# Patient Record
Sex: MALE | Race: WHITE | Employment: OTHER | URBAN - METROPOLITAN AREA
[De-identification: names, ages, dates, MRNs, and addresses within clinical notes are randomized per-mention and may not be internally consistent; named-entity substitution may affect disease eponyms.]

---

## 2019-12-21 ENCOUNTER — APPOINTMENT (OUTPATIENT)
Dept: GENERAL RADIOLOGY | Age: 49
End: 2019-12-21
Payer: MEDICAID

## 2019-12-21 ENCOUNTER — HOSPITAL ENCOUNTER (EMERGENCY)
Age: 49
Discharge: HOME | End: 2019-12-21
Attending: EMERGENCY MEDICINE
Payer: MEDICAID

## 2019-12-21 VITALS
SYSTOLIC BLOOD PRESSURE: 142 MMHG | TEMPERATURE: 99.7 F | RESPIRATION RATE: 18 BRPM | HEART RATE: 102 BPM | OXYGEN SATURATION: 99 % | BODY MASS INDEX: 22.1 KG/M2 | DIASTOLIC BLOOD PRESSURE: 90 MMHG | WEIGHT: 141.09 LBS

## 2019-12-21 DIAGNOSIS — M79.642 HAND PAIN, LEFT: ICD-10-CM

## 2019-12-21 DIAGNOSIS — M79.641 HAND PAIN, RIGHT: Primary | ICD-10-CM

## 2019-12-21 PROCEDURE — 6370000000 HC RX 637 (ALT 250 FOR IP): Performed by: EMERGENCY MEDICINE

## 2019-12-21 PROCEDURE — 99283 EMERGENCY DEPT VISIT LOW MDM: CPT

## 2019-12-21 RX ORDER — NAPROXEN 500 MG/1
500 TABLET ORAL 2 TIMES DAILY
Qty: 60 TABLET | Refills: 0 | Status: SHIPPED | OUTPATIENT
Start: 2019-12-21 | End: 2020-01-07

## 2019-12-21 RX ORDER — TRAMADOL HYDROCHLORIDE 50 MG/1
50 TABLET ORAL ONCE
Status: COMPLETED | OUTPATIENT
Start: 2019-12-21 | End: 2019-12-21

## 2019-12-21 RX ADMIN — TRAMADOL HYDROCHLORIDE 50 MG: 50 TABLET, FILM COATED ORAL at 06:22

## 2019-12-21 ASSESSMENT — PAIN DESCRIPTION - ORIENTATION: ORIENTATION: LEFT

## 2019-12-21 ASSESSMENT — PAIN DESCRIPTION - PROGRESSION: CLINICAL_PROGRESSION: GRADUALLY WORSENING

## 2019-12-21 ASSESSMENT — PAIN DESCRIPTION - LOCATION: LOCATION: HAND

## 2019-12-21 ASSESSMENT — PAIN SCALES - GENERAL
PAINLEVEL_OUTOF10: 8
PAINLEVEL_OUTOF10: 8
PAINLEVEL_OUTOF10: 7

## 2019-12-21 ASSESSMENT — PAIN DESCRIPTION - FREQUENCY: FREQUENCY: CONTINUOUS

## 2019-12-21 ASSESSMENT — PAIN DESCRIPTION - ONSET: ONSET: ON-GOING

## 2019-12-21 ASSESSMENT — PAIN - FUNCTIONAL ASSESSMENT: PAIN_FUNCTIONAL_ASSESSMENT: PREVENTS OR INTERFERES SOME ACTIVE ACTIVITIES AND ADLS

## 2019-12-21 ASSESSMENT — PAIN DESCRIPTION - PAIN TYPE: TYPE: ACUTE PAIN

## 2019-12-21 ASSESSMENT — PAIN DESCRIPTION - DESCRIPTORS: DESCRIPTORS: DISCOMFORT

## 2019-12-22 ASSESSMENT — ENCOUNTER SYMPTOMS
BACK PAIN: 0
COLOR CHANGE: 0

## 2019-12-30 ENCOUNTER — HOSPITAL ENCOUNTER (EMERGENCY)
Age: 49
Discharge: HOME | End: 2019-12-30
Payer: MEDICAID

## 2019-12-30 VITALS
HEIGHT: 70 IN | TEMPERATURE: 98 F | OXYGEN SATURATION: 100 % | HEART RATE: 90 BPM | WEIGHT: 138.67 LBS | RESPIRATION RATE: 16 BRPM | BODY MASS INDEX: 19.85 KG/M2 | DIASTOLIC BLOOD PRESSURE: 88 MMHG | SYSTOLIC BLOOD PRESSURE: 145 MMHG

## 2019-12-30 PROCEDURE — 99283 EMERGENCY DEPT VISIT LOW MDM: CPT

## 2019-12-30 ASSESSMENT — ENCOUNTER SYMPTOMS
SHORTNESS OF BREATH: 0
CHEST TIGHTNESS: 0
VOMITING: 0
NAUSEA: 0

## 2019-12-30 ASSESSMENT — PAIN DESCRIPTION - ORIENTATION: ORIENTATION: RIGHT;LEFT

## 2019-12-30 ASSESSMENT — PAIN DESCRIPTION - LOCATION: LOCATION: ARM

## 2019-12-30 ASSESSMENT — PAIN DESCRIPTION - PAIN TYPE: TYPE: ACUTE PAIN

## 2019-12-30 ASSESSMENT — PAIN SCALES - GENERAL: PAINLEVEL_OUTOF10: 7

## 2019-12-30 ASSESSMENT — PAIN DESCRIPTION - DESCRIPTORS: DESCRIPTORS: ACHING

## 2019-12-30 NOTE — ED NOTES
Acknowledged pt by pt's name. Verified pt by name and date of birth. Checked arm band, allergies, reviewed past medical history. Introduced myself to patient  Duration of ED plan of care explained to patient  Explained planned tests and procedures  Thanked patient for coming to [facility].    Asked if there was anything else I could do for the patient before exiting room. CB in reach.      Analisa Huizar, RN  12/30/19 2421

## 2019-12-30 NOTE — CARE COORDINATION
ED Advocate met with patient and discussed the importance of a PCP. ED Advocate discussed [facility] PCP's and Patient is agreeable to see Sandra Perales in the [facility].   ED Advocate call [facility] Referral line and set up appointment for patient on [email or web address].    Patient given information on [facility]

## 2019-12-30 NOTE — ED PROVIDER NOTES
[address] [address]  Dept: [address]: [phone]  eMERGENCYdEPARTMENT eNCOUnter      Pt Name: Oskar Rogers  MRN: [id number]  Sanjeev 1970  Date of evaluation: 12/30/2019  Provider:Florina Lao PA-C    CHIEF COMPLAINT       Chief Complaint   Patient presents with    Arm Pain     bilateral x 2 weeks. 4th visit to ERs for the same. 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 HISTORY OF PRESENT ILLNESS  (Location/Symptom, Timing/Onset, Context/Setting, Quality, Duration,Modifying Factors, Severity.)   Oksar Rogers is a 44 y.o. male who presents to the emergency department complaining of bilateral arm pain. Patient states symptoms began after loading to [place] with boxes through work at [employer]. Patient woke up with pain and swelling in his wrist the next morning. He was seen at outside ED for symptoms. He was started on naproxen however did not fill medication. Was then prescribed prednisone which he has been taking as prescribed with no improvement. Patient followed up as an outpatient with physical therapy. Patient due for physical therapy again on 1/2/2019. Patient states pain has been progressively worsening in severity. Patient states he always has numbness and tingling in his left arm. States numbness and tingling have been worse since injury. Pain rated 7/10. All pain worsens with movement. Nursing Notes were reviewedand agreed with or any disagreements were addressed in the HPI. REVIEW OF SYSTEMS    (2-9 systems for level 4, 10 or more for level 5)     Review of Systems   Constitutional: Negative for chills and fever. HENT: Negative. Respiratory: Negative for chest tightness and shortness of breath. Cardiovascular: Negative. Gastrointestinal: Negative for nausea and vomiting. Genitourinary: Negative.     Musculoskeletal: Positive for arthralgias and joint swelling. Skin: Negative. Neurological: Positive for numbness. Psychiatric/Behavioral: Negative for behavioral problems and confusion. Except as noted above the remainder of the review of systems was reviewed and negative. PAST MEDICAL HISTORY   History reviewed. No pertinent past medical history. SURGICAL HISTORY     History reviewed. No pertinent surgical history. CURRENT MEDICATIONS     [unfilled]    ALLERGIES     Pcn [penicillins]    FAMILY HISTORY     History reviewed. No pertinent family history. No family status information on file. SOCIAL HISTORY      reports that he has been smoking cigarettes. He has never used smokeless tobacco. He reports current alcohol use. PHYSICAL EXAM    (up to 7 for level 4, 8 or more for level 5)     ED Triage Vitals [12/30/19 1605]   Enc Vitals Group      BP (!) 145/88      Pulse 90      Resp 16      Temp 98 °F (36.7 °C)      Temp Source Oral      SpO2 100 %      Weight 138 lb 10.7 oz (62.9 kg)      Height 5' 10\" (1.778 m)      Head Circumference       Peak Flow       Pain Score       Pain Loc       Pain Edu? Excl. in [address]? Physical Exam  Vitals signs reviewed. Constitutional:       Appearance: Normal appearance. HENT:      Head: Normocephalic and atraumatic. Nose: Nose normal.   Eyes:      Extraocular Movements: Extraocular movements intact. Musculoskeletal:      Comments: LUE: Tenderness to palpation throughout left wrist with associated edema, no erythema or warmth. Reproducible pain with range of motion of the left wrist, no other focal bony tenderness throughout extremity. Radial pulse +2, motor median/ulnar/radial nerve intact   Skin:     General: Skin is warm. Neurological:      General: No focal deficit present. Mental Status: He is alert and oriented to person, place, and time.    Psychiatric:         Mood and Affect: Mood normal.         Behavior: Behavior normal.           DIAGNOSTIC RESULTS     EKG: All EKG's are interpreted by the Emergency Department Physician who either signs or Co-signs this chart in the absence of a cardiologist.    RADIOLOGY:   Non-plain film images such as CT, Ultrasound and MRI are read by the radiologist. Plain radiographic images are visualized and preliminarilyinterpreted by the emergency physician with the below findings:    Interpretation per the Radiologist below,if available at the time of this note:    No orders to display         LABS:  Labs Reviewed - No data to display    All other labs were within normal range or not returned as of this dictation. EMERGENCY DEPARTMENT COURSE and DIFFERENTIAL DIAGNOSIS/MDM:   Vitals:    Vitals:    12/30/19 1605   BP: (!) 145/88   Pulse: 90   Resp: 16   Temp: 98 °F (36.7 °C)   TempSrc: Oral   SpO2: 100%   Weight: 138 lb 10.7 oz (62.9 kg)   Height: 5' 10\" (1.778 m)       MDM     Patient presents ED with HPI noted above. He is hemodynamically stable, afebrile and nontoxic-appearing. He is not hypoxic oxygen saturation of 100% on room air. Physical exam as above. Reproducible left wrist pain on exam.  Patient has chronic numbness and tingling to left upper extremity, has been worse since injury. Denies any new trauma or injury since. All pain reproducible on exam.  Patient with no other associated physical complaints. Patient is an alcoholic. He is currently taking steroids that were prescribed at outside hospital for this injury for tendinitis. He is already been seen and evaluated regarding physical therapy. He already has a splint. He missed his appointment with his orthopedic specialist.  There are no new complaints today no associated symptoms. He was concerned for nerve injury. He is already on prednisone. There is no additional test warranted or indicated in the ED at this time. Patient is to follow-up with orthopedic specialist as previously arranged, provided contact information at time of discharge.   Told return the ED should he have acute worsening or new symptoms. Discharged home in stable condition. The patient tolerated their visit well. I have discussed the findings of today's workup with the patient and addressed the patient's questions and concerns. Important warning signs as well as new or worsening symptoms which would necessitate immediate return to the ED were discussed. The plan is to discharge from the ED at this time, and the patient is in stable condition. The patient acknowledged understanding is agreeable with this plan. CONSULTS:  None    PROCEDURES:  Procedures    FINAL IMPRESSION      1.  Left wrist pain          DISPOSITION/PLAN   [unfilled]    PATIENT REFERRED TO:  [address]  [phone]  Go to   If symptoms worsen    [facility]  Phone: [phone]:  Discharge Medication List as of 12/30/2019  5:52 PM          (Please note that portions of this note were completed with a voice recognition program.  Efforts were made to edit the dictations but occasionally words are mis-transcribed.)    [address] [facility]          [address], [place]  12/30/19 8047

## 2020-01-07 ENCOUNTER — HOSPITAL ENCOUNTER (EMERGENCY)
Age: 50
Discharge: HOME | End: 2020-01-08
Attending: EMERGENCY MEDICINE
Payer: MEDICAID

## 2020-01-07 ENCOUNTER — HOSPITAL ENCOUNTER (EMERGENCY)
Age: 50
Discharge: HOME | End: 2020-01-07
Attending: EMERGENCY MEDICINE
Payer: MEDICAID

## 2020-01-07 ENCOUNTER — HOSPITAL ENCOUNTER (EMERGENCY)
Age: 50
Discharge: HOME | End: 2020-01-07
Payer: MEDICAID

## 2020-01-07 ENCOUNTER — APPOINTMENT (OUTPATIENT)
Dept: GENERAL RADIOLOGY | Age: 50
End: 2020-01-07
Payer: MEDICAID

## 2020-01-07 VITALS
HEART RATE: 86 BPM | RESPIRATION RATE: 18 BRPM | SYSTOLIC BLOOD PRESSURE: 135 MMHG | TEMPERATURE: 98.1 F | OXYGEN SATURATION: 94 % | WEIGHT: 154.98 LBS | BODY MASS INDEX: 22.24 KG/M2 | DIASTOLIC BLOOD PRESSURE: 82 MMHG

## 2020-01-07 VITALS
WEIGHT: 144.4 LBS | TEMPERATURE: 98.6 F | DIASTOLIC BLOOD PRESSURE: 100 MMHG | HEIGHT: 67 IN | SYSTOLIC BLOOD PRESSURE: 125 MMHG | OXYGEN SATURATION: 97 % | HEART RATE: 90 BPM | BODY MASS INDEX: 22.66 KG/M2 | RESPIRATION RATE: 18 BRPM

## 2020-01-07 VITALS
SYSTOLIC BLOOD PRESSURE: 129 MMHG | DIASTOLIC BLOOD PRESSURE: 85 MMHG | WEIGHT: 140.65 LBS | TEMPERATURE: 97.8 F | RESPIRATION RATE: 15 BRPM | HEART RATE: 89 BPM | OXYGEN SATURATION: 96 % | BODY MASS INDEX: 20.18 KG/M2

## 2020-01-07 PROCEDURE — 99283 EMERGENCY DEPT VISIT LOW MDM: CPT

## 2020-01-07 PROCEDURE — 73110 X-RAY EXAM OF WRIST: CPT

## 2020-01-07 PROCEDURE — 99282 EMERGENCY DEPT VISIT SF MDM: CPT

## 2020-01-07 ASSESSMENT — PAIN SCALES - GENERAL
PAINLEVEL_OUTOF10: 8
PAINLEVEL_OUTOF10: 7
PAINLEVEL_OUTOF10: 8

## 2020-01-07 ASSESSMENT — PAIN DESCRIPTION - FREQUENCY
FREQUENCY: CONTINUOUS
FREQUENCY: CONTINUOUS

## 2020-01-07 ASSESSMENT — PAIN DESCRIPTION - LOCATION
LOCATION: HAND
LOCATION: ARM

## 2020-01-07 ASSESSMENT — PAIN DESCRIPTION - PAIN TYPE
TYPE: ACUTE PAIN
TYPE: ACUTE PAIN

## 2020-01-07 ASSESSMENT — PAIN DESCRIPTION - DESCRIPTORS
DESCRIPTORS: BURNING
DESCRIPTORS: ACHING

## 2020-01-07 ASSESSMENT — PAIN DESCRIPTION - PROGRESSION: CLINICAL_PROGRESSION: NOT CHANGED

## 2020-01-07 ASSESSMENT — PAIN DESCRIPTION - ORIENTATION
ORIENTATION: LEFT
ORIENTATION: LEFT

## 2020-01-07 ASSESSMENT — PAIN DESCRIPTION - ONSET
ONSET: AWAKENED FROM SLEEP
ONSET: ON-GOING

## 2020-01-07 ASSESSMENT — PAIN - FUNCTIONAL ASSESSMENT: PAIN_FUNCTIONAL_ASSESSMENT: ACTIVITIES ARE NOT PREVENTED

## 2020-01-07 NOTE — ED NOTES
Discharge instructions reviewed with pt and pt denied having any questions. Discharge paperwork signed and pt discharged.         Danya Cooks, RN  01/07/20 9504

## 2020-01-07 NOTE — ED PROVIDER NOTES
Triage Chief Complaint:   Arm Pain (Brought by [facility]. Pt was waiting outside his home, has leaves allover his shirt and hat, disheveled appearance. c/o left arm pain from an old injury in December at [employer]. smell of alcohol noted. Alert and oriented. )      [place]:  Doreen Griffin is a 44 y.o. male that presents with pain in his left arm. Patient states that he injured his arm loading [employer] truck with boxes on 19 December. He has been seen several times in various emergency departments for the same problem. He states that initially his pain was in his hand and wrist on both sides. It got worse on the left side and then began moving up his arm. He has been in wrist splints but is no longer wearing it. He has been given Naprosyn and steroids. Tendinopathy and neuropraxia has been suggested as an etiology. Patient states that he was referred to a specialist but has failed outpatient treatment. Initially he told me that he was not currently covered by insurance but this is a Workmen's Comp. injury. Patient was significantly intoxicated at time of exam and admits to being an alcoholic. He was covered in leaves at the time of my exam and states that he fell in the driveway of his home. He identifies no acute injury from the fall tonight.     ROS:  · Constitutional: No Fever or Weight Loss  · Eyes: No Decreased Vision  · ENT: No stridor, sore throat, congestion,    · Cardiovascular: No chest pain or palpitations   · Respiratory: No shortness of breath, cough, or wheezing  · Gastrointestinal: No abdominal pain, nausea, vomiting, or diarrhea  · Genitourinary: No dysuria, or flank pain  · Musculoskeletal:  No  weakness, positive left wrist and arm pain  · Integumentary: No rash, bruising, or pruritis  · Psychiatric: No anxiety or depression, positive alcoholism    Past Medical History:   Diagnosis Date    ETOH abuse      Past Surgical History:   Procedure Laterality Date    WRIST SURGERY Right History reviewed. No pertinent family history. Social History     Socioeconomic History    Marital status: Single     Spouse name: Not on file    Number of children: Not on file    Years of education: Not on file    Highest education level: Not on file   Occupational History    Not on file   Social Needs    Financial resource strain: Not on file    Food insecurity:     Worry: Not on file     Inability: Not on file    Transportation needs:     Medical: Not on file     Non-medical: Not on file   Tobacco Use    Smoking status: Current Every Day Smoker     Types: Cigarettes    Smokeless tobacco: Never Used   Substance and Sexual Activity    Alcohol use: Yes     Comment: daily    Drug use: Not on file    Sexual activity: Not on file   Lifestyle    Physical activity:     Days per week: Not on file     Minutes per session: Not on file    Stress: Not on file   Relationships    Social connections:     Talks on phone: Not on file     Gets together: Not on file     Attends Anabaptism service: Not on file     Active member of club or organization: Not on file     Attends meetings of clubs or organizations: Not on file     Relationship status: Not on file    Intimate partner violence:     Fear of current or ex partner: Not on file     Emotionally abused: Not on file     Physically abused: Not on file     Forced sexual activity: Not on file   Other Topics Concern    Not on file   Social History Narrative    Not on file     No current facility-administered medications for this encounter.       Current Outpatient Medications   Medication Sig Dispense Refill    Capsaicin (ZOSTRIX) 0.035 % CREA cream Apply topically 3 times daily 42.5 g 0    naproxen (NAPROSYN) 500 MG tablet Take 1 tablet by mouth 2 times daily 60 tablet 0     Allergies   Allergen Reactions    Codeine Itching    Pcn [Penicillins]      Nursing Notes Reviewed    Physical Exam:  ED Triage Vitals [01/07/20 0250]   Enc Vitals Group      BP (!) 153/99      Pulse 91      Resp 17      Temp 98.1 °F (36.7 °C)      Temp Source Oral      SpO2 99 %      Weight 154 lb 15.7 oz (70.3 kg)      Height       Head Circumference       Peak Flow       Pain Score       Pain Loc       Pain Edu? Excl. in [address]? GENERAL APPEARANCE: A disheveled intoxicated jovial 49-year-old male in no acute distress  HEAD: Normocephalic, atraumatic  EYES: Sclera anicteric.bilateral conjunctival injection,  ENT: Mucous membranes moist, no nasal discharge, pharynx clear, no stridor,   NECK: Supple, no meningismus, no pain with movement or palpation, no upper extremity radiculopathy with 5/5 strength normal sensation and full radial ulnar and median nerve function in the hands with bilaterally normal radial and ulnar pulses  HEART: RRR without rubs murmurs or gallops  LUNGS:  Clear good air movement no wheezing no retraction or accessory muscle use,  EXTREMITIES: No acute deformities, patient has as mentioned full radial ulnar and median nerve function in both hands. He had carpal tunnel surgery done on the right wrist with a well-healed incision, and a large laceration on the ulnar aspect of the left wrist which she was unable to remember. SKIN: Warm and dry. Normal color, no rash,  capillary refill less than 2 seconds  MENTAL STATUS: Alert, oriented, intoxicated but in control of his faculties, generally interactive,   NEUROLOGICAL:  No facial drooping. moves all 4 extremities, sensation intact, no focal findings     Nursing note and vital signs reviewed     I have reviewed and interpreted all of the currently available lab results from this visit (if applicable):  No results found for this visit on 01/07/20. Radiographs (if obtained):  [] Radiologist's Report Reviewed:  XR WRIST LEFT (MIN 3 VIEWS)   Final Result   No acute fracture or malalignment.              [] Discussed with Radiologist:     [] The following radiograph was interpreted by myself in the absence of a radiologist:     EKG (if obtained): (All EKG's are interpreted by myself in the absence of a cardiologist)      MDM:   Alcoholic man with ongoing problems from an injury had occurred at work. He has failed outpatient follow-up with orthopedics and I have referred him once again. After some discussion he understands that this is a Workmen's Comp. injury and will be covered by the Workmen's Comp. insurance with no out-of-pocket expenses for him. At this point in time I find no evidence of any acute injury or radiculopathy. His arm and hand is vascularly intact. I also discussed with him the need to follow-up with Alcoholics Anonymous and work a program of recovery    Final Impression:  1. Left arm pain    2. Acute alcoholic intoxication without complication [facility])        Critical Care:       Disposition referral (if applicable):  Juvenal Hoskins MD  [address] [place] [address]. [address] [place], [place] [phone]    Call in 1 day        Disposition medications (if applicable):  New Prescriptions    No medications on file       Comment: Please note this report has been produced using speech recognition software and may contain errors related to that system including errors in grammar, punctuation, and spelling, as well as words and phrases that may be inappropriate. If there are any questions or concerns please feel free to contact the dictating provider for clarification.       (Please note that portions of this note may have been completed with a voice recognition program. Efforts were made to edit the dictations but occasionally words are mis-transcribed.)    MD Lisy Barroso., MD  01/07/20 5572

## 2020-01-08 PROCEDURE — 6360000002 HC RX W HCPCS: Performed by: EMERGENCY MEDICINE

## 2020-01-08 PROCEDURE — 96372 THER/PROPH/DIAG INJ SC/IM: CPT

## 2020-01-08 RX ORDER — KETOROLAC TROMETHAMINE 30 MG/ML
30 INJECTION, SOLUTION INTRAMUSCULAR; INTRAVENOUS ONCE
Status: COMPLETED | OUTPATIENT
Start: 2020-01-08 | End: 2020-01-08

## 2020-01-08 RX ADMIN — KETOROLAC TROMETHAMINE 30 MG: 30 INJECTION, SOLUTION INTRAMUSCULAR at 01:28

## 2020-01-08 ASSESSMENT — PAIN SCALES - GENERAL: PAINLEVEL_OUTOF10: 7

## 2020-01-08 NOTE — ED PROVIDER NOTES
deformity. No tenderness in the extremities. All extremities neurovascularly intact. Radial, Dp, and PT pulses +2/4 bilaterally   Neurological: Alert and oriented. No facial droop. Strength 5/5 in UE and LE bilaterally. Sensation intact x 4. No aphasia or dysarthria. No limb or gait ataxia. Psychiatric: appears intoxicated but able to have conversation and exhibits capacity     LABS  I have reviewed all labs for this visit. No results found for this visit on 01/07/20. ED COURSE/MDM  Patient seen and evaluated. Old records reviewed. Labs and imaging reviewed and results discussed with patient. Patient is a 49-year-old male presenting with left arm pain. This is his third emergency department as it in the past 24 hours. He denies any change in the pain, he states he is frustrated and wants the problem to be fixed. He has an appointment with orthopedics on Thursday. There is no soft tissue swelling or sign of septic joint on my exam.  He is afebrile and has normal vital signs here. The arm is neurovascularly intact and he exhibits full motion intact. He does have history of alcohol abuse and does smell of alcohol here, although he does appear to be able to have full conversation and exhibits capacity. I do not feel indication for imaging tonight. I did explain to him that I can treat his pain with Toradol here but he ultimately needs to follow-up with orthopedics. He does voice understanding of this. He is discharged in stable condition. During the patient's ED course, the patient was given:  Medications   ketorolac (TORADOL) injection 30 mg (30 mg Intramuscular Given 1/8/20 0128)        CLINICAL IMPRESSION  1. Left arm pain        Blood pressure (!) 125/100, pulse 90, temperature 98.6 °F (37 °C), temperature source Oral, resp. rate 18, height 5' 7\" (1.702 m), weight 144 lb 6.4 oz (65.5 kg), SpO2 97 %. Patient was given scripts for the following medications.  I counseled patient how to take

## 2020-01-08 NOTE — ED PROVIDER NOTES
[address] [address] [address] [address]  Dept: [phone]  Loc: [address] ENCOUNTER        This patient was not seen or evaluated by the attending physician. I evaluated this patient, the attending physician was available for consultation. CHIEF COMPLAINT    Chief Complaint   Patient presents with    Hand Pain     pt presents to ED for c/o L hand pain since 12/18/19. seen at [facility] ER today and has an appointment at [facility] specialsit on thursday. pt appears to be intoxicated. HPI    Matilde Curran is a 44 y.o. male who presents with left wrist pain. The onset was on 12/18. The duration has been constant since the onset. The quality of the pain is sharp and the severity is 8/10. The patient has no other associated injury. 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 He was seen at [address] [place] [address] emergency department earlier today, had an x-ray that showed no fracture. He presents to our ED complaining that they were unable to stop his pain at his prior ED visit.     REVIEW OF SYSTEMS    Skin: No lacerations or puncture wounds  Musculoskeletal: see HPI, no other joint or bony injury or pain  Neurologic: no paresthesias or focal distal extremity weakness    PAST MEDICAL & SURGICAL HISTORY    Past Medical History:   Diagnosis Date    ETOH abuse      Past Surgical History:   Procedure Laterality Date    WRIST SURGERY Right        CURRENT MEDICATIONS  (may include discharge medications prescribed in the ED)  Current Outpatient Rx   Medication Sig Dispense Refill    Capsaicin (ZOSTRIX) 0.035 % CREA cream Apply topically 3 times daily 42.5 g 0    naproxen (NAPROSYN) 500 MG tablet Take 1 tablet by mouth 2 times daily 60 tablet 0       ALLERGIES    Allergies   Allergen Reactions    Codeine Itching    Pcn [Penicillins]        SOCIAL & FAMILY HISTORY    Social History     Socioeconomic History    Marital status: Single     Spouse name: None    Number of children: None    Years of education: None    Highest education level: None   Occupational History    None   Social Needs    Financial resource strain: None    Food insecurity:     Worry: None     Inability: None    Transportation needs:     Medical: None     Non-medical: None   Tobacco Use    Smoking status: Current Every Day Smoker     Types: Cigarettes    Smokeless tobacco: Never Used   Substance and Sexual Activity    Alcohol use: Yes     Comment: daily    Drug use: None    Sexual activity: None   Lifestyle    Physical activity:     Days per week: None     Minutes per session: None    Stress: None   Relationships    Social connections:     Talks on phone: None     Gets together: None     Attends Islam service: None     Active member of club or organization: None     Attends meetings of clubs or organizations: None     Relationship status: None    Intimate partner violence:     Fear of current or ex partner: None     Emotionally abused: None     Physically abused: None     Forced sexual activity: None   Other Topics Concern    None   Social History Narrative    None     History reviewed. No pertinent family history.       PHYSICAL EXAM    VITAL SIGNS: /85   Pulse 113   Temp 97.8 °F (36.6 °C) (Temporal)   Resp 15   Wt 140 lb 10.5 oz (63.8 kg)   SpO2 96%   BMI 20.18 kg/m²   Constitutional:  Well nourished, no acute distress  HENT:  Atraumatic, moist mucous membranes  NECK: normal range of motion,  supple   Respiratory:  No respiratory distress  Cardiovascular:  No JVD  Vascular: left radial pulse 2+  Musculoskeletal:  +mild tenderness to palpation of the left wrist joint, no edema, no deformity  Integument:  Well hydrated, no skin lacerations, no erythema or warmth of the wrist  Neurologic:  Awake alert, no slurred speech, sensory and motor intact    RADIOLOGY (performed

## 2020-01-09 ENCOUNTER — HOSPITAL ENCOUNTER (EMERGENCY)
Age: 50
Discharge: HOME | End: 2020-01-10
Attending: EMERGENCY MEDICINE
Payer: MEDICAID

## 2020-01-09 PROCEDURE — 99284 EMERGENCY DEPT VISIT MOD MDM: CPT

## 2020-01-10 VITALS
DIASTOLIC BLOOD PRESSURE: 88 MMHG | TEMPERATURE: 98 F | HEART RATE: 88 BPM | RESPIRATION RATE: 16 BRPM | OXYGEN SATURATION: 97 % | SYSTOLIC BLOOD PRESSURE: 142 MMHG | WEIGHT: 144.62 LBS | HEIGHT: 68 IN | BODY MASS INDEX: 21.92 KG/M2

## 2020-01-10 ASSESSMENT — PAIN SCALES - GENERAL: PAINLEVEL_OUTOF10: 7

## 2020-01-10 ASSESSMENT — PAIN DESCRIPTION - LOCATION: LOCATION: OTHER (COMMENT)

## 2020-01-10 ASSESSMENT — PAIN DESCRIPTION - ORIENTATION: ORIENTATION: LEFT

## 2020-01-10 ASSESSMENT — PAIN DESCRIPTION - PAIN TYPE: TYPE: ACUTE PAIN;CHRONIC PAIN

## 2020-01-10 ASSESSMENT — PAIN DESCRIPTION - DESCRIPTORS: DESCRIPTORS: ACHING

## 2020-01-10 NOTE — ED PROVIDER NOTES
 Transportation needs:     Medical: None     Non-medical: None   Tobacco Use    Smoking status: Current Every Day Smoker     Packs/day: 1.00     Types: Cigarettes    Smokeless tobacco: Never Used   Substance and Sexual Activity    Alcohol use: Yes     Comment: daily    Drug use: None    Sexual activity: None   Lifestyle    Physical activity:     Days per week: None     Minutes per session: None    Stress: None   Relationships    Social connections:     Talks on phone: None     Gets together: None     Attends Anabaptism service: None     Active member of club or organization: None     Attends meetings of clubs or organizations: None     Relationship status: None    Intimate partner violence:     Fear of current or ex partner: None     Emotionally abused: None     Physically abused: None     Forced sexual activity: None   Other Topics Concern    None   Social History Narrative    None       SCREENINGS             PHYSICAL EXAM    (up to 7 for level 4, 8 or more for level 5)     ED Triage Vitals [01/09/20 2357]   BP Temp Temp Source Pulse Resp SpO2 Height Weight   (!) 147/98 98 °F (36.7 °C) Oral 91 16 94 % 5' 7.5\" (1.715 m) 144 lb 10 oz (65.6 kg)       Physical Exam  Vitals signs and nursing note reviewed. Constitutional:       Appearance: Normal appearance. He is well-developed. He is not ill-appearing. Comments: Slightly unkempt and does smell of alcohol. HENT:      Head: Normocephalic and atraumatic. Right Ear: External ear normal.      Left Ear: External ear normal.      Nose: Nose normal.   Eyes:      General: No scleral icterus. Right eye: No discharge. Left eye: No discharge. Conjunctiva/sclera: Conjunctivae normal.   Neck:      Musculoskeletal: Neck supple. Cardiovascular:      Rate and Rhythm: Normal rate. Pulmonary:      Effort: Pulmonary effort is normal. No respiratory distress.    Musculoskeletal:      Comments: Besides a chronically deformed finger on the left hand no other gross abnormality. Patient has normal sensation normal range of motion. No asymmetry. Skin:     Coloration: Skin is not pale. Neurological:      Mental Status: He is alert. Comments: Normal sensation to soft touch. Psychiatric:         Mood and Affect: Mood normal.         Behavior: Behavior normal.             DIAGNOSTIC RESULTS     EKG: All EKG's are interpreted by the Emergency Department Physician who either signs or Co-signs this chart in the absence of a cardiologist.    12 lead EKG shows     RADIOLOGY:   Non-plain film images such as CT, Ultrasound and MRI are read by the radiologist. Plain radiographic images are visualized and preliminarily interpreted by the emergency physician with the below findings:        Interpretation per the Radiologist below, if available at the time of this note:    No orders to display         ED BEDSIDE ULTRASOUND:   Performed by ED Physician - none    LABS:  Labs Reviewed - No data to display    All other labs were within normal range or not returned as of this dictation. EMERGENCY DEPARTMENT COURSE and DIFFERENTIAL DIAGNOSIS/MDM:   Vitals:    Vitals:    01/09/20 2357 01/10/20 0054   BP: (!) 147/98 (!) 142/88   Pulse: 91 88   Resp: 16 16   Temp: 98 °F (36.7 °C) 98 °F (36.7 °C)   TempSrc: Oral Oral   SpO2: 94% 97%   Weight: 144 lb 10 oz (65.6 kg)    Height: 5' 7.5\" (1.715 m)        Chronic left upper extremity pain and paresthesia. Denied the patient's symptoms mostly for numbness. The patient does have an appointment next week with orthopedic surgery. He has had multiple ER visits for the same complaint. I do not believe that further work-up in the emergency room is necessary. The patient is asking if he can sleep and get a blanket. The patient is informed that his diagnostic work-up was unremarkable and that he will be discharged home. Return instructions discussed.                 CRITICAL CARE TIME None      CONSULTS:  None    PROCEDURES:  Unless otherwise noted above, none     Procedures    FINAL IMPRESSION      1. Arm paresthesia, left          DISPOSITION/PLAN   DISPOSITION Decision To Discharge 01/10/2020 12:42:18 AM      PATIENT REFERREDTO:    Please keep your scheduled appointment on Monday with your provider. DISCHARGEMEDICATIONS:  There are no discharge medications for this patient.          (Please note that portions of this note were completed with a voice recognition program.  Efforts were made to edit the dictations but occasionally words are mis-transcribed.)    Ivette Meza MD (electronically signed)  Attending Emergency Physician        Ivette Meza MD  01/10/20 2141

## 2020-01-13 ENCOUNTER — OFFICE VISIT (OUTPATIENT)
Dept: ORTHOPEDIC SURGERY | Age: 50
End: 2020-01-13
Payer: COMMERCIAL

## 2020-01-13 VITALS — RESPIRATION RATE: 16 BRPM | HEIGHT: 68 IN | WEIGHT: 140 LBS | BODY MASS INDEX: 21.22 KG/M2

## 2020-01-13 PROBLEM — G56.03 BILATERAL CARPAL TUNNEL SYNDROME: Status: ACTIVE | Noted: 2020-01-13

## 2020-01-13 PROCEDURE — 99203 OFFICE O/P NEW LOW 30 MIN: CPT | Performed by: ORTHOPAEDIC SURGERY

## 2020-01-13 NOTE — PROGRESS NOTES
This 44 y.o., right hand dominant  is seen in referral for the ED at [facility] with a chief complaint of numbness and tingling of the bilateral hands, L>R. Symptoms have been present for 3 weeks. The patient also frequently notices pain in the hand, wrist and arm, as well as weakness of , morning stiffness and swelling as well as difficulty performing functions which require fine touch. Symptoms are sometimes worse at night and can disturb sleep. The problem seems to recently have become worse. Conservative treatment has been prescribed(brace, NSAID). There is history of wrist fracture(right). There is history and/or family history of diabetes(mother). There is no history of thyroid disease. There is no family history of carpal tunnel syndrome. The pain assessment has been reviewed and is correct as stated. The patient's social history, past medical history, family history, medications, allergies and review of systems, entered 1/13/20, have been reviewed, and dated and are recorded in the chart. On examination the patient is Height: 5' 7.5\" (171.5 cm) tall and weighs Weight: 140 lb (63.5 kg). Respirations are 18 per minute. The patient is well nourished, is oriented to time and place, demonstrates appropriate mood and affect as well as normal gait and station. There is mild soft tissue swelling involving the volar aspect of the bilateral wrists. There is moderate bilateral thenar muscle atrophy. The Tinel sign is positive over the median nerve at the  carpal tunnel bilaterally and negative over the ulnar nerve at the elbow bilaterally. The Phalen test is positive on the left at 10 seconds. There is hypalgesia, with associated dysesthesia, on sensory testing over the median nerve distribution. Two point discrimination is normal at the tip of the middle fingers. Range of motion of the fingers, thumbs and wrists is normal.  Skin is intact without lesions.   Distal

## 2020-01-16 ENCOUNTER — TELEPHONE (OUTPATIENT)
Dept: ORTHOPEDIC SURGERY | Age: 50
End: 2020-01-16

## 2020-01-23 PROBLEM — Z72.0 TOBACCO ABUSE: Status: ACTIVE | Noted: 2020-01-02

## 2020-01-23 PROBLEM — F10.939 ALCOHOL WITHDRAWAL SYNDROME WITH COMPLICATION (HCC): Status: ACTIVE | Noted: 2020-01-02

## 2020-11-06 ENCOUNTER — HOSPITAL ENCOUNTER (EMERGENCY)
Age: 50
Discharge: HOME | End: 2020-11-07
Attending: EMERGENCY MEDICINE
Payer: MEDICAID

## 2020-11-06 ENCOUNTER — APPOINTMENT (OUTPATIENT)
Dept: GENERAL RADIOLOGY | Age: 50
End: 2020-11-06
Payer: MEDICAID

## 2020-11-06 VITALS
HEART RATE: 114 BPM | DIASTOLIC BLOOD PRESSURE: 128 MMHG | SYSTOLIC BLOOD PRESSURE: 162 MMHG | TEMPERATURE: 98.6 F | OXYGEN SATURATION: 95 % | RESPIRATION RATE: 22 BRPM

## 2020-11-06 LAB
ANION GAP SERPL CALCULATED.3IONS-SCNC: 17 MMOL/L (ref 3–16)
BASOPHILS ABSOLUTE: 0.1 K/UL (ref 0–0.2)
BASOPHILS RELATIVE PERCENT: 1.3 %
BUN BLDV-MCNC: 5 MG/DL (ref 7–20)
CALCIUM SERPL-MCNC: 8.9 MG/DL (ref 8.3–10.6)
CHLORIDE BLD-SCNC: 105 MMOL/L (ref 99–110)
CO2: 26 MMOL/L (ref 21–32)
CREAT SERPL-MCNC: 0.6 MG/DL (ref 0.9–1.3)
EOSINOPHILS ABSOLUTE: 0.4 K/UL (ref 0–0.6)
EOSINOPHILS RELATIVE PERCENT: 4.8 %
GFR AFRICAN AMERICAN: >60
GFR NON-AFRICAN AMERICAN: >60
GLUCOSE BLD-MCNC: 111 MG/DL (ref 70–99)
HCT VFR BLD CALC: 45.1 % (ref 40.5–52.5)
HEMOGLOBIN: 15.3 G/DL (ref 13.5–17.5)
LYMPHOCYTES ABSOLUTE: 4.1 K/UL (ref 1–5.1)
LYMPHOCYTES RELATIVE PERCENT: 53.9 %
MCH RBC QN AUTO: 33.6 PG (ref 26–34)
MCHC RBC AUTO-ENTMCNC: 33.8 G/DL (ref 31–36)
MCV RBC AUTO: 99.2 FL (ref 80–100)
MONOCYTES ABSOLUTE: 0.5 K/UL (ref 0–1.3)
MONOCYTES RELATIVE PERCENT: 6.7 %
NEUTROPHILS ABSOLUTE: 2.5 K/UL (ref 1.7–7.7)
NEUTROPHILS RELATIVE PERCENT: 33.3 %
PDW BLD-RTO: 12.5 % (ref 12.4–15.4)
PLATELET # BLD: 278 K/UL (ref 135–450)
PMV BLD AUTO: 7.9 FL (ref 5–10.5)
POTASSIUM REFLEX MAGNESIUM: 4.4 MMOL/L (ref 3.5–5.1)
RBC # BLD: 4.54 M/UL (ref 4.2–5.9)
SODIUM BLD-SCNC: 148 MMOL/L (ref 136–145)
TROPONIN: <0.01 NG/ML
WBC # BLD: 7.6 K/UL (ref 4–11)

## 2020-11-06 PROCEDURE — 93005 ELECTROCARDIOGRAM TRACING: CPT | Performed by: EMERGENCY MEDICINE

## 2020-11-06 PROCEDURE — 71045 X-RAY EXAM CHEST 1 VIEW: CPT

## 2020-11-06 PROCEDURE — 80048 BASIC METABOLIC PNL TOTAL CA: CPT

## 2020-11-06 PROCEDURE — 36415 COLL VENOUS BLD VENIPUNCTURE: CPT

## 2020-11-06 PROCEDURE — 84484 ASSAY OF TROPONIN QUANT: CPT

## 2020-11-06 PROCEDURE — 99284 EMERGENCY DEPT VISIT MOD MDM: CPT

## 2020-11-06 PROCEDURE — 85025 COMPLETE CBC W/AUTO DIFF WBC: CPT

## 2020-11-06 RX ORDER — METHYLPREDNISOLONE SODIUM SUCCINATE 125 MG/2ML
125 INJECTION, POWDER, LYOPHILIZED, FOR SOLUTION INTRAMUSCULAR; INTRAVENOUS ONCE
Status: COMPLETED | OUTPATIENT
Start: 2020-11-06 | End: 2020-11-07

## 2020-11-06 RX ORDER — IPRATROPIUM BROMIDE AND ALBUTEROL SULFATE 2.5; .5 MG/3ML; MG/3ML
1 SOLUTION RESPIRATORY (INHALATION) ONCE
Status: COMPLETED | OUTPATIENT
Start: 2020-11-06 | End: 2020-11-07

## 2020-11-06 RX ORDER — ALBUTEROL SULFATE 2.5 MG/3ML
5 SOLUTION RESPIRATORY (INHALATION) ONCE
Status: COMPLETED | OUTPATIENT
Start: 2020-11-06 | End: 2020-11-07

## 2020-11-06 ASSESSMENT — PAIN DESCRIPTION - ONSET: ONSET: SUDDEN

## 2020-11-06 ASSESSMENT — PAIN DESCRIPTION - LOCATION: LOCATION: CHEST

## 2020-11-06 ASSESSMENT — PAIN DESCRIPTION - FREQUENCY: FREQUENCY: CONTINUOUS

## 2020-11-06 ASSESSMENT — PAIN SCALES - GENERAL: PAINLEVEL_OUTOF10: 7

## 2020-11-07 LAB
EKG ATRIAL RATE: 103 BPM
EKG DIAGNOSIS: NORMAL
EKG P AXIS: 69 DEGREES
EKG P-R INTERVAL: 162 MS
EKG Q-T INTERVAL: 332 MS
EKG QRS DURATION: 74 MS
EKG QTC CALCULATION (BAZETT): 434 MS
EKG R AXIS: 56 DEGREES
EKG T AXIS: 66 DEGREES
EKG VENTRICULAR RATE: 103 BPM

## 2020-11-07 PROCEDURE — 6360000002 HC RX W HCPCS: Performed by: EMERGENCY MEDICINE

## 2020-11-07 PROCEDURE — 6370000000 HC RX 637 (ALT 250 FOR IP): Performed by: EMERGENCY MEDICINE

## 2020-11-07 PROCEDURE — 93010 ELECTROCARDIOGRAM REPORT: CPT | Performed by: INTERNAL MEDICINE

## 2020-11-07 RX ORDER — PREDNISONE 10 MG/1
40 TABLET ORAL DAILY
Qty: 20 TABLET | Refills: 0 | Status: SHIPPED | OUTPATIENT
Start: 2020-11-07 | End: 2020-11-12

## 2020-11-07 RX ADMIN — IPRATROPIUM BROMIDE AND ALBUTEROL SULFATE 1 AMPULE: .5; 3 SOLUTION RESPIRATORY (INHALATION) at 00:57

## 2020-11-07 RX ADMIN — METHYLPREDNISOLONE SODIUM SUCCINATE 125 MG: 125 INJECTION, POWDER, FOR SOLUTION INTRAMUSCULAR; INTRAVENOUS at 00:45

## 2020-11-07 RX ADMIN — ALBUTEROL SULFATE 5 MG: 2.5 SOLUTION RESPIRATORY (INHALATION) at 00:57

## 2020-11-07 NOTE — ED PROVIDER NOTES
CHIEF COMPLAINT  Shortness of Breath (States that he has been having trouble breathing for the past 5 days. States that he tried using his inhaler/nebulizer 20 times today with no effect)      HISTORY OF PRESENT ILLNESS  Sid Waldrop is a 44 y.o. male who presents to the ED with EMS complaining of shortness of breath. Patient has history of COPD, EtOH abuse and asthma. States he has been using his inhaler as needed without relief of his shortness of breath. States he is still smoking 1 pack of cigarettes a day. States he has had a dry cough over the past week. Denies any fevers or chills. States he is having chest tightness. Denies any fevers or chills. No abdominal pain, nausea or vomiting. Denies any illicit drug use. Denies any recent travel. No known sick contacts. No other complaints, modifying factors or associated symptoms. Nursing notes reviewed. Past Medical History:   Diagnosis Date    Asthma     COPD (chronic obstructive pulmonary disease) ([facility] [address]     ETOH abuse      Past Surgical History:   Procedure Laterality Date    WRIST SURGERY Right      History reviewed. No pertinent family history. Social History     Socioeconomic History    Marital status: Single     Spouse name: Not on file    Number of children: Not on file    Years of education: Not on file    Highest education level: Not on file   Occupational History    Not on file   Social Needs    Financial resource strain: Not on file    Food insecurity     Worry: Not on file     Inability: Not on file    Transportation needs     Medical: Not on file     Non-medical: Not on file   Tobacco Use    Smoking status: Current Every Day Smoker     Packs/day: 1.00     Types: Cigarettes    Smokeless tobacco: Never Used   Substance and Sexual Activity    Alcohol use:  Yes     Alcohol/week: 12.0 standard drinks     Types: 12 Cans of beer per week     Comment: daily    Drug use: Never    Sexual activity: Not Currently   Lifestyle    Physical activity     Days per week: Not on file     Minutes per session: Not on file    Stress: Not on file   Relationships    Social connections     Talks on phone: Not on file     Gets together: Not on file     Attends Denominational service: Not on file     Active member of club or organization: Not on file     Attends meetings of clubs or organizations: Not on file     Relationship status: Not on file    Intimate partner violence     Fear of current or ex partner: Not on file     Emotionally abused: Not on file     Physically abused: Not on file     Forced sexual activity: Not on file   Other Topics Concern    Not on file   Social History Narrative    Not on file     No current facility-administered medications for this encounter.       Current Outpatient Medications   Medication Sig Dispense Refill    predniSONE (DELTASONE) 10 MG tablet Take 4 tablets by mouth daily for 5 doses 20 tablet 0    albuterol (PROVENTIL) (5 MG/ML) 0.5% nebulizer solution Take 0.5 mLs by nebulization every 6 hours as needed for Wheezing 30 vial 0     Allergies   Allergen Reactions    Codeine Itching    Pcn [Penicillins]          REVIEW OF SYSTEMS  10 systems reviewed, pertinent positives per HPI otherwise noted to be negative    PHYSICAL EXAM  BP (!) 162/128   Pulse 114   Temp 98.6 °F (37 °C) (Oral)   Resp 22   SpO2 95%      CONSTITUTIONAL: AOx4, cooperative with exam, afebrile   HEAD: normocephalic, atraumatic   EYES: PERRL, EOMI, anicteric sclera   ENT: Moist mucous membranes, uvula midline, no swelling of the posterior pharynx   NECK: Supple, symmetric, trachea midline, no stridor   LUNGS: Bilateral breath sounds, inspiratory and expiratory wheezing bilaterally, speaking in full sentences   CARDIOVASCULAR: RRR, normal S1/S2, no m/r/g, 2+ pulses throughout   ABDOMEN: Soft, non-tender, non-distended, +BS   NEUROLOGIC:  MAEx4, 5/5 strength throughout; fine touch sensation intact throughout; normal gait; GCS 15   MUSCULOSKELETAL:  No swelling of the lower extremities, no calf tenderness bilaterally   SKIN: No rash, pallor or wounds on exposed surfaces         RADIOLOGY  X-RAYS:  I have reviewed radiologic plain film image(s). ALL OTHER NON-PLAIN FILM IMAGES SUCH AS CT, ULTRASOUND AND MRI HAVE BEEN READ BY THE RADIOLOGIST. XR CHEST PORTABLE   Final Result   No evidence of acute cardiopulmonary disease                EKG INTERPRETATION  Sinus tachycardia with a rate of 103, normal axis, , QRS 74, QTc 434, no ST elevations, nonspecific ST changes, no acute change compared EKG from 7/3/2017    PROCEDURES    ED COURSE/MDM  URI, COPD, asthma, CHF, ACS/MI, pneumonia, tobacco abuse  Patient seen and evaluated. History and physical as above. Nontoxic, afebrile. Patient with history of COPD and asthma presents with shortness of breath. Patient does have inspiratory and expiratory wheezing bilaterally. Patient speaking in full sentences. O2 saturation 92% on room air on arrival.  No home oxygen use. ED Course as of Nov 07 0059   Sat Nov 07, 2020   5696 Patient reassessed. Patient states he does feel slightly better after the breathing treatment. On reauscultation of the lungs, patient no longer having inspiratory wheezing. Very faint expiratory wheezing. Much better air movement. O2 saturation 95% on room air. Discussed discharge with prednisone burst as well as refill of her acebutolol nebulizer solution. Patient agreeable. Discussed cutting back and quitting smoking. Patient's repeat pulse prior to discharge 114 although he did just receive albuterol and DuoNeb treatments. No further complaints of chest pain. Return instructions provided. All questions answered prior to discharge.     [DS]      ED Course User Index  [DS] Carter Palmer MD       I estimate there is LOW risk for ACUTE CORONARY SYNDROME, CHRONIC OBSTRUCTIVE PULMONARY DISEASE, CONGESTIVE HEART FAILURE, PERICARDIAL TAMPONADE, PNEUMONIA, PNEUMOTHORAX, PULMONARY EMBOLISM, SEPSIS, and THORACIC DISSECTION,  thus I consider the discharge disposition reasonable. [address] and I have discussed the diagnosis and risks, and we agree with discharging home to follow-up with their primary doctor. We also discussed returning to the Emergency Department immediately if new or worsening symptoms occur. We have discussed the symptoms which are most concerning (e.g., bloody sputum, fever, worsening pain or shortness of breath, vomiting) that necessitate immediate return. Patient was given scripts for the following medications. I counseled patient how to take these medications. Discharge Medication List as of 11/7/2020 12:45 AM      START taking these medications    Details   predniSONE (DELTASONE) 10 MG tablet Take 4 tablets by mouth daily for 5 doses, Disp-20 tablet,R-0Print      albuterol (PROVENTIL) (5 MG/ML) 0.5% nebulizer solution Take 0.5 mLs by nebulization every 6 hours as needed for Wheezing, Disp-30 vial,R-0Print                 CLINICAL IMPRESSION  1. COPD exacerbation (HCC)    2. Tobacco abuse        Blood pressure (!) 162/128, pulse 114, temperature 98.6 °F (37 °C), temperature source Oral, resp. rate 22, SpO2 95 %. DISPOSITION  Patient was discharged to home in good condition. [facility]  [phone]  Call   For a follow up appointment. Disclaimer: All medical record entries made by [address] [place] [address] [address].       (Please note that this note was completed with a voice recognition program. Every attempt was made to edit the dictations, but inevitably there remain words that are mis-transcribed.)            Glendy Plata MD  11/07/20 Ragini South MD  11/07/20 Ragini South MD  11/07/20 5289

## 2021-01-12 ENCOUNTER — APPOINTMENT (OUTPATIENT)
Dept: CT IMAGING | Age: 51
End: 2021-01-12
Payer: MEDICAID

## 2021-01-12 ENCOUNTER — HOSPITAL ENCOUNTER (EMERGENCY)
Age: 51
Discharge: HOME | End: 2021-01-12
Attending: STUDENT IN AN ORGANIZED HEALTH CARE EDUCATION/TRAINING PROGRAM
Payer: MEDICAID

## 2021-01-12 VITALS
HEART RATE: 127 BPM | TEMPERATURE: 98.5 F | RESPIRATION RATE: 18 BRPM | BODY MASS INDEX: 21.6 KG/M2 | DIASTOLIC BLOOD PRESSURE: 92 MMHG | SYSTOLIC BLOOD PRESSURE: 121 MMHG | OXYGEN SATURATION: 98 % | HEIGHT: 68 IN

## 2021-01-12 DIAGNOSIS — S01.01XA LACERATION OF SCALP, INITIAL ENCOUNTER: Primary | ICD-10-CM

## 2021-01-12 DIAGNOSIS — F10.920 ACUTE ALCOHOLIC INTOXICATION WITHOUT COMPLICATION (HCC): ICD-10-CM
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
## 2021-01-12 PROCEDURE — 72125 CT NECK SPINE W/O DYE: CPT

## 2021-01-12 PROCEDURE — 6360000002 HC RX W HCPCS: Performed by: STUDENT IN AN ORGANIZED HEALTH CARE EDUCATION/TRAINING PROGRAM

## 2021-01-12 PROCEDURE — 70450 CT HEAD/BRAIN W/O DYE: CPT

## 2021-01-12 PROCEDURE — 90715 TDAP VACCINE 7 YRS/> IM: CPT | Performed by: STUDENT IN AN ORGANIZED HEALTH CARE EDUCATION/TRAINING PROGRAM

## 2021-01-12 PROCEDURE — 90471 IMMUNIZATION ADMIN: CPT | Performed by: STUDENT IN AN ORGANIZED HEALTH CARE EDUCATION/TRAINING PROGRAM

## 2021-01-12 PROCEDURE — 99284 EMERGENCY DEPT VISIT MOD MDM: CPT

## 2021-01-12 RX ORDER — HALOPERIDOL 5 MG/ML
5 INJECTION INTRAMUSCULAR ONCE
Status: DISCONTINUED | OUTPATIENT
Start: 2021-01-12 | End: 2021-01-12

## 2021-01-12 RX ADMIN — TETANUS TOXOID, REDUCED DIPHTHERIA TOXOID AND ACELLULAR PERTUSSIS VACCINE, ADSORBED 0.5 ML: 5; 2.5; 8; 8; 2.5 SUSPENSION INTRAMUSCULAR at 16:46

## 2021-01-12 NOTE — ED NOTES
Patient brought in by squad. Argumentative, does not want to be treated.  Dr. Patricia Bosch in to see patient      Lulu Mccall RN  01/12/21 3431

## 2021-01-12 NOTE — ED PROVIDER NOTES
[facility] ENCOUNTER      Pt Name: Genie Schmidt  MRN: [id number]  [place] 1970  Date of evaluation: 1/12/2021  Provider: Ross Do MD    CHIEF COMPLAINT     Found down in the middle of the street intoxicated with L frontal scalp laceration. HISTORY OF PRESENT ILLNESS   (Location/Symptom, Timing/Onset,Context/Setting, Quality, Duration, Modifying Factors, Severity)  Note limiting factors. Genie Schmidt is a 44 y.o. male who presents to the emergency department, found down in the middle of the street, endorses drinking a lot of alcohol prior, does not remember how he got there. He is BIBEMS and police. He is obviously intoxicated and does not want medical attention, but is amenable to stay when redirected that since he is intoxicated he is required to stay for his workup. He endorses a small L sided scalp laceration. He otherwise has no complaints and history is limited 2/2 his intoxication and lack of recollection of the prior events. Symptoms not otherwise alleviated or exacerbated by other factors. NursingNotes were reviewed. REVIEW OF SYSTEMS    (2-9 systems for level 4, 10 or more for level 5)       Constitutional: No fever or chills. Eye: No visual disturbances. No eye pain. Ear/Nose/Mouth/Throat: No nasal congestion. No sore throat. Respiratory: No cough, No shortness of breath, No sputum production. Cardiovascular: No chest pain. No palpitations. Gastrointestinal: No abdominal pain. No nausea or vomiting  Genitourinary: No dysuria. No hematuria. Hematology/Lymphatics: No bleeding or bruising tendency. Immunologic: No malaise. No swollen glands. Musculoskeletal: No back pain. No joint pain. Integumentary: No rash. No abrasions. Neurologic: No headache. No focal numbness or weakness.       PAST MEDICAL HISTORY     Past Medical History:   Diagnosis Date    Asthma     COPD (chronic obstructive pulmonary disease) ([facility] [address]     ETOH abuse          SURGICALHISTORY       Past Surgical History:   Procedure Laterality Date    WRIST SURGERY Right          CURRENT MEDICATIONS       Previous Medications    ALBUTEROL (PROVENTIL) (5 MG/ML) 0.5% NEBULIZER SOLUTION    Take 0.5 mLs by nebulization every 6 hours as needed for Wheezing       ALLERGIES     Codeine and Pcn [penicillins]    FAMILY HISTORY     No family history on file. SOCIAL HISTORY       Social History     Socioeconomic History    Marital status: Single     Spouse name: None    Number of children: None    Years of education: None    Highest education level: None   Occupational History    None   Social Needs    Financial resource strain: None    Food insecurity     Worry: None     Inability: None    Transportation needs     Medical: None     Non-medical: None   Tobacco Use    Smoking status: Current Every Day Smoker     Packs/day: 1.00     Types: Cigarettes    Smokeless tobacco: Never Used   Substance and Sexual Activity    Alcohol use:  Yes     Alcohol/week: 12.0 standard drinks     Types: 12 Cans of beer per week     Comment: daily    Drug use: Never    Sexual activity: Not Currently   Lifestyle    Physical activity     Days per week: None     Minutes per session: None    Stress: None   Relationships    Social connections     Talks on phone: None     Gets together: None     Attends Voodoo service: None     Active member of club or organization: None     Attends meetings of clubs or organizations: None     Relationship status: None    Intimate partner violence     Fear of current or ex partner: None     Emotionally abused: None     Physically abused: None     Forced sexual activity: None   Other Topics Concern    None   Social History Narrative    None       SCREENINGS             PHYSICAL EXAM    (up to 7 for level 4, 8 or more for level 5)     ED Triage Vitals [01/12/21 1616]   BP Temp Temp Source Pulse Resp SpO2 Height Weight (!) 150/106 98.5 °F (36.9 °C) Oral 126 16 98 % -- --       General: Alert and oriented appropriately for age, slurred speech. Disheveled, angry affect. No acute distress. Eye: Normal conjunctiva. Pupils equal and reactive. HENT: Oral mucosa is moist.  Small, < 1 cm scalp laceration to the L frontotemporal scalp with underlying soft tissue contusion. Glasses bent. CERVICAL SPINE: There is no cervical midline tenderness to palpation, step-offs, or acute deformities. No posterior midline pain on full ROM. Respiratory: Respirations even and non-labored. No stridor. Lungs CTAB. Chest wall nttp. No ecchymosis. Cardiovascular: Tachycardic, Regular rhythm. Intact peripheral pulses throughout. Gastrointestinal: Soft, Non-tender, Non-distended. Musculoskeletal: No swelling. Compartments soft and compressible. No deformities, exhibits full ROM. Integumentary: Warm, Dry. Neurologic: Alert and appropriate for age. CN II-XII intact, strength 5/5 throughout. Sensation intact. Intention tremor on FTN, unsteady gait. No focal deficits. Psychiatric: Cooperative. DIAGNOSTIC RESULTS         RADIOLOGY:   Non-plain filmimages such as CT, Ultrasound and MRI are read by the radiologist. Plain radiographic images are visualized and preliminarily interpreted by the emergency physician with the below findings:      Interpretation per the Radiologist below, if available at the time ofthis note:    CT CERVICAL SPINE WO CONTRAST   Final Result   No acute abnormality of the cervical spine. CT HEAD WO CONTRAST   Final Result   No acute intracranial abnormality. No hemorrhage or hematoma.   Encephalomalacia in the frontal lobes bilaterally   suspicious for old contusions                 EMERGENCY DEPARTMENT COURSE and DIFFERENTIAL DIAGNOSIS/MDM:   Vitals:    Vitals:    01/12/21 1616 01/12/21 1626 01/12/21 1727   BP: (!) 150/106  (!) 121/92   Pulse: 126  127   Resp: 16  18   Temp: 98.5 °F (36.9 °C) TempSrc: Oral     SpO2: 98%  98%   Height:  5' 7.5\" (1.715 m)          Medical decision makin yo M with h/o EtOH abuse who p/w obvious head trauma, found down intoxicated in the middle of the street. L scalp laceration not requiring repair but pt cannot remember last TDaP. Given TDaP, needs CTH and C-spine given intoxication and trauma without clear mechanism. No e/o external trauma elsewhere, no chest/abd tenderness. No extremity deformities, neuro exam GCS 15 and c/w intox but otherwise no FND. Pt initially belligerent and wanting to leave as was appropriately brought against his will for med eval given his intoxication by EMS and police. Haldol ordered for chemical restraint if necessary to protect the pt against his desire to leave without appropriate eval but pt able to be redirected to stay for workup. Rec'd CTH and CT C-spine, which are negative for acute injury, is tolerating PO and after a period of observation in the ED no longer demonstrating intention tremor or unsteadiness, no longer clinically intoxicated. Pt offered IVFs for his persistent tachycardia, repeat abd exam remains soft, nttp. Likely volume depleted. No e/o active hemorrhage apparent and as pt now GCS 15, no longer demonstrating clinical intoxication, pt wishing to leave. He is discharged home with return precautions, provided transport to his home and departed the ED ambulatory, neurologically intact without deficits. Given outpt follow up. Medications   Tetanus-Diphth-Acell Pertussis (BOOSTRIX) injection 0.5 mL (0.5 mLs Intramuscular Given 21 0865)         FINAL IMPRESSION      1. Laceration of scalp, initial encounter    2. Fall, initial encounter    3. Acute alcoholic intoxication without complication ([facility] [address]          DISPOSITION/PLAN   DISPOSITION  Home.       PATIENT REFERRED TO:  Myrna Shaikh  [phone]          DISCHARGE MEDICATIONS:  Discharge Medication List as of 2021  5:27 PM (Please note that portions of this note were completed with a voice recognition program.Efforts were made to edit the dictations but occasionally words are mis-transcribed.)    Yolanda Wagner MD (electronically signed)  Attending Emergency Physician         Yolanda Wagner MD  01/13/21 1589

## 2021-01-12 NOTE — ED NOTES
Up walking around unit. Wants to go home.  Dr. Junior with patient     Arlene Soto RN  01/12/21 8540

## 2022-04-01 ENCOUNTER — HOSPITAL ENCOUNTER (EMERGENCY)
Age: 52
Discharge: HOME | End: 2022-04-01
Attending: EMERGENCY MEDICINE
Payer: MEDICAID

## 2022-04-01 VITALS
DIASTOLIC BLOOD PRESSURE: 80 MMHG | OXYGEN SATURATION: 95 % | BODY MASS INDEX: 25.21 KG/M2 | WEIGHT: 163.36 LBS | RESPIRATION RATE: 14 BRPM | HEART RATE: 108 BPM | SYSTOLIC BLOOD PRESSURE: 135 MMHG | TEMPERATURE: 98.5 F

## 2022-04-01 DIAGNOSIS — G89.29 CHRONIC BILATERAL LOW BACK PAIN WITHOUT SCIATICA: Primary | ICD-10-CM

## 2022-04-01 DIAGNOSIS — M54.50 CHRONIC BILATERAL LOW BACK PAIN WITHOUT SCIATICA: Primary | ICD-10-CM

## 2022-04-01 PROCEDURE — 6370000000 HC RX 637 (ALT 250 FOR IP): Performed by: EMERGENCY MEDICINE

## 2022-04-01 PROCEDURE — 99283 EMERGENCY DEPT VISIT LOW MDM: CPT

## 2022-04-01 RX ORDER — LIDOCAINE 4 G/G
1 PATCH TOPICAL ONCE
Status: DISCONTINUED | OUTPATIENT
Start: 2022-04-01 | End: 2022-04-01 | Stop reason: HOSPADM

## 2022-04-01 ASSESSMENT — ENCOUNTER SYMPTOMS
BACK PAIN: 1
VOMITING: 0
ABDOMINAL PAIN: 0
NAUSEA: 0

## 2022-04-01 ASSESSMENT — PAIN - FUNCTIONAL ASSESSMENT: PAIN_FUNCTIONAL_ASSESSMENT: 0-10

## 2022-04-01 ASSESSMENT — PAIN SCALES - GENERAL: PAINLEVEL_OUTOF10: 9

## 2022-04-01 NOTE — ED PROVIDER NOTES
[address] [facility]      Pt Name: Yessy Whitmore  MRN: [id number]  [place] 1970  Date ofevaluation: 4/1/2022  Provider: Luis Mccabe MD    CHIEF COMPLAINT       Chief Complaint   Patient presents with    Back Pain         HISTORY OF PRESENT ILLNESS   (Location/Symptom, Timing/Onset,Context/Setting, Quality, Duration, Modifying Factors, Severity)  Note limiting factors. Yessy Whitmore is a 46 y.o. male  who  has a past medical history of Asthma, COPD (chronic obstructive pulmonary disease) ([address] and ETOH abuse. who presents to the emergency department for evaluation of back pain. Patient reports a chronic history of back pain. He states that it is currently at baseline. Denies any recent injury or trauma. He states that he has back pain forever and that it is no worse than it has been. Denies numbness or weakness or difficulties ambulating. Denies change in bowel or urine function. Reports that he previously did see a orthopedist but currently does not. He does not take medications for his pain because \"he does not want to\". When asked why the patient presented to the ED this evening he states because \"my back is messed up. He does confirm that his back has been messed up for a long time. Patient also reportedly intoxicated. He does endorse alcohol use this evening. He does insist that he has had no recent injury or trauma. HPI    NursingNotes were reviewed. REVIEW OF SYSTEMS    (2-9 systems for level 4, 10 or more for level 5)     Review of Systems   Constitutional: Negative for fever. Gastrointestinal: Negative for abdominal pain, nausea and vomiting. Musculoskeletal: Positive for back pain. Negative for gait problem, neck pain and neck stiffness. Skin: Negative for wound. Except as noted above the remainder of the review of systems was reviewed and negative.        PAST MEDICAL HISTORY Past Medical History:   Diagnosis Date    Asthma     COPD (chronic obstructive pulmonary disease) ([facility]     ETOH abuse          SURGICALHISTORY       Past Surgical History:   Procedure Laterality Date    WRIST SURGERY Right          CURRENT MEDICATIONS       Previous Medications    ALBUTEROL (PROVENTIL) (5 MG/ML) 0.5% NEBULIZER SOLUTION    Take 0.5 mLs by nebulization every 6 hours as needed for Wheezing            Codeine and Pcn [penicillins]    FAMILY HISTORY     No family history on file. SOCIAL HISTORY       Social History     Socioeconomic History    Marital status: Single     Spouse name: Not on file    Number of children: Not on file    Years of education: Not on file    Highest education level: Not on file   Occupational History    Not on file   Tobacco Use    Smoking status: Current Every Day Smoker     Packs/day: 1.00     Types: Cigarettes    Smokeless tobacco: Never Used   Vaping Use    Vaping Use: Never used   Substance and Sexual Activity    Alcohol use: Yes     Alcohol/week: 12.0 standard drinks     Types: 12 Cans of beer per week     Comment: daily    Drug use: Never    Sexual activity: Not Currently   Other Topics Concern    Not on file   Social History Narrative    Not on file     Social Determinants of Health     Financial Resource Strain:     Difficulty of Paying Living Expenses: Not on file   Food Insecurity:     Worried About Running Out of Food in the Last Year: Not on file    Brandon of Food in the Last Year: Not on file   Transportation Needs:     Lack of Transportation (Medical): Not on file    Lack of Transportation (Non-Medical):  Not on file   Physical Activity:     Days of Exercise per Week: Not on file    Minutes of Exercise per Session: Not on file   Stress:     Feeling of Stress : Not on file   Social Connections:     Frequency of Communication with Friends and Family: Not on file    Frequency of Social Gatherings with Friends and Family: Not on file    Attends Buddhist Services: Not on file    Active Member of Clubs or Organizations: Not on file    Attends Club or Organization Meetings: Not on file    Marital Status: Not on file   Intimate Partner Violence:     Fear of Current or Ex-Partner: Not on file    Emotionally Abused: Not on file    Physically Abused: Not on file    Sexually Abused: Not on file   Housing Stability:     Unable to Pay for Housing in the Last Year: Not on file    Number of [place] in the Last Year: Not on file    Unstable Housing in the Last Year: Not on file       SCREENINGS             PHYSICAL EXAM    (up to 7 for level 4, 8 or more for level 5)     ED Triage Vitals [04/01/22 0108]   BP Temp Temp src Pulse Resp SpO2 Height Weight   135/80 98.5 °F (36.9 °C) -- 108 14 95 % -- 163 lb 5.8 oz (74.1 kg)       Physical Exam  Vitals and nursing note reviewed. Constitutional:       General: He is not in acute distress. Appearance: He is well-developed. HENT:      Head: Normocephalic and atraumatic. Eyes:      Extraocular Movements: Extraocular movements intact. Conjunctiva/sclera: Conjunctivae normal.      Pupils: Pupils are equal, round, and reactive to light. Neck:      Trachea: No tracheal deviation. Cardiovascular:      Rate and Rhythm: Normal rate and regular rhythm. Pulmonary:      Effort: Pulmonary effort is normal.      Breath sounds: Normal breath sounds. No wheezing or rales. Abdominal:      General: There is no distension. Palpations: Abdomen is soft. Tenderness: There is no abdominal tenderness. Musculoskeletal:         General: No deformity. Normal range of motion. Cervical back: Normal range of motion. Skin:     General: Skin is warm and dry. Neurological:      General: No focal deficit present. Mental Status: He is alert and oriented to person, place, and time. Cranial Nerves: No cranial nerve deficit. Sensory: No sensory deficit.       Motor: No weakness. Coordination: Coordination normal.      Gait: Gait abnormal.         RESULTS     EKG: All EKG's are interpreted by the Emergency Department Physician who either signs or Co-signsthis chart in the absence of a cardiologist.      RADIOLOGY:   Otis Bigger such as CT, Ultrasound and MRI are read by the radiologist. Plain radiographic images are visualized and preliminarily interpreted by the emergency physician with the below findings:      Interpretation per the Radiologist below, if available at the time ofthis note:    No orders to display         ED BEDSIDE ULTRASOUND:   Performed by ED Physician - none    LABS:  Labs Reviewed - No data to display    All other labs were within normal range or not returned as of this dictation. EMERGENCY DEPARTMENT COURSE and DIFFERENTIAL DIAGNOSIS/MDM:   Vitals:    Vitals:    04/01/22 0108   BP: 135/80   Pulse: 108   Resp: 14   Temp: 98.5 °F (36.9 °C)   SpO2: 95%   Weight: 163 lb 5.8 oz (74.1 kg)       Patient was given thefollowing medications:  Medications   lidocaine 4 % external patch 1 patch (has no administration in time range)       ED COURSE & MEDICAL DECISION MAKING    Pertinent Labs & Imaging studies reviewed. (See chart for details)   -  Patient seen and evaluated in the emergency department. -  Triage and nursing notes reviewed and incorporated. -  Old chart records reviewed and incorporated. -  Differential diagnosis includes: Differential Diagnosis: Spinal Epidural Abscess, Vertebral Osteomyelitis, Cauda Equina Syndrome, Spinal Cord Compression, Conus Medullaris Syndrome, ruptured/dissecting Abdominal Aortic Aneurysm, Metastases to the back, Kidney Stone, Pyelonephritis, Fracture or dislocation, other    -  Work-up included:  See above  -  ED treatment included: See above  -  Results discussed with patient. Patient presents to the ED with complaint of chronic back pain.   States is no different than the chronic back pain he experiences daily. Denies recent injury or trauma. Denies new focal neurological deficits. Denies incontinence of bowel or urine. He does not take medications for his symptoms. Patient is also intoxicated demanding x-rays of his back. Patient informed that imaging is not currently necessary as he has no focal logical deficits and no new injury. Patient verbally abusive aggressive towards ED staff. He was discharged with outpatient referral.  Ming Leaks was provided to the patient. He is ambulate without difficulties emergency department. Patient feels improved on reevaluation. Symptomatic treatment with expectant management discussed with the patient and they and/or family members present are amenable to treatment plan and outpatient follow-up. Strict return precautions were discussed with the patient and those present. They demonstrated understanding of when to return to the emergency department for new or worsening symptoms. .  The patient is agreeable with plan of care and disposition. REASSESSMENT          CRITICAL CARE TIME   Total Critical Care time was 0 minutes, excluding separately reportable procedures. There was a high probability of clinically significant/life threatening deterioration in the patient's condition which required my urgent intervention. CONSULTS:  None    PROCEDURES:  Unless otherwise noted below, none     Procedures    FINAL IMPRESSION      1. Chronic bilateral low back pain without sciatica          DISPOSITION/PLAN   DISPOSITION        PATIENT REFERREDTO:  No follow-up provider specified.     DISCHARGEMEDICATIONS:  New Prescriptions    No medications on file          (Please note that portions of this note were completed with a voice recognition program.  Efforts were made to edit the dictations but occasionally words are mis-transcribed.)    Luis Felipe Lewis MD (electronically signed)  Attending Emergency Physician          Luis Felipe Lewis MD  04/01/22 8638

## 2022-04-01 NOTE — ED TRIAGE NOTES
Pt has chronic pain. Pt does not take anything at home. Pt states he just deals with it.  Pt states the pain starts in his back and goes to his legs

## 2022-05-23 ENCOUNTER — APPOINTMENT (OUTPATIENT)
Dept: GENERAL RADIOLOGY | Age: 52
End: 2022-05-23
Payer: MEDICAID

## 2022-05-23 ENCOUNTER — HOSPITAL ENCOUNTER (EMERGENCY)
Age: 52
Discharge: HOME | End: 2022-05-23
Attending: EMERGENCY MEDICINE
Payer: MEDICAID

## 2022-05-23 VITALS
RESPIRATION RATE: 16 BRPM | WEIGHT: 149.8 LBS | SYSTOLIC BLOOD PRESSURE: 130 MMHG | TEMPERATURE: 98.2 F | BODY MASS INDEX: 22.7 KG/M2 | OXYGEN SATURATION: 99 % | DIASTOLIC BLOOD PRESSURE: 94 MMHG | HEIGHT: 68 IN | HEART RATE: 126 BPM

## 2022-05-23 DIAGNOSIS — S32.020A CLOSED COMPRESSION FRACTURE OF L2 LUMBAR VERTEBRA, INITIAL ENCOUNTER (HCC): Primary | ICD-10-CM

## 2022-05-23 DIAGNOSIS — M54.50 ACUTE EXACERBATION OF CHRONIC LOW BACK PAIN: ICD-10-CM

## 2022-05-23 DIAGNOSIS — G89.29 ACUTE EXACERBATION OF CHRONIC LOW BACK PAIN: ICD-10-CM

## 2022-05-23 DIAGNOSIS — F10.10 ALCOHOL ABUSE: ICD-10-CM

## 2022-05-23 LAB
BILIRUBIN URINE: NEGATIVE
BLOOD, URINE: NEGATIVE
CLARITY: CLEAR
COLOR: YELLOW
GLUCOSE URINE: NEGATIVE MG/DL
KETONES, URINE: NEGATIVE MG/DL
LEUKOCYTE ESTERASE, URINE: NEGATIVE
MICROSCOPIC EXAMINATION: NORMAL
NITRITE, URINE: NEGATIVE
PH UA: 5.5 (ref 5–8)
PROTEIN UA: NEGATIVE MG/DL
SPECIFIC GRAVITY UA: <=1.005 (ref 1–1.03)
URINE REFLEX TO CULTURE: NORMAL
URINE TYPE: NORMAL
UROBILINOGEN, URINE: 0.2 E.U./DL

## 2022-05-23 PROCEDURE — 81003 URINALYSIS AUTO W/O SCOPE: CPT

## 2022-05-23 PROCEDURE — 72100 X-RAY EXAM L-S SPINE 2/3 VWS: CPT

## 2022-05-23 PROCEDURE — 99284 EMERGENCY DEPT VISIT MOD MDM: CPT

## 2022-05-23 PROCEDURE — 6370000000 HC RX 637 (ALT 250 FOR IP): Performed by: EMERGENCY MEDICINE

## 2022-05-23 PROCEDURE — 6360000002 HC RX W HCPCS: Performed by: EMERGENCY MEDICINE

## 2022-05-23 PROCEDURE — 96372 THER/PROPH/DIAG INJ SC/IM: CPT

## 2022-05-23 RX ORDER — LIDOCAINE 50 MG/G
1 PATCH TOPICAL DAILY
Qty: 10 PATCH | Refills: 0 | Status: SHIPPED | OUTPATIENT
Start: 2022-05-23 | End: 2022-06-02

## 2022-05-23 RX ORDER — IBUPROFEN 600 MG/1
600 TABLET ORAL EVERY 6 HOURS PRN
Qty: 30 TABLET | Refills: 0 | Status: SHIPPED | OUTPATIENT
Start: 2022-05-23 | End: 2022-06-15

## 2022-05-23 RX ORDER — ACETAMINOPHEN 325 MG/1
650 TABLET ORAL EVERY 6 HOURS PRN
Qty: 30 TABLET | Refills: 0 | Status: SHIPPED | OUTPATIENT
Start: 2022-05-23 | End: 2022-06-15

## 2022-05-23 RX ORDER — ALBUTEROL SULFATE 90 UG/1
2 AEROSOL, METERED RESPIRATORY (INHALATION) EVERY 6 HOURS PRN
COMMUNITY
End: 2022-07-13 | Stop reason: SDUPTHER

## 2022-05-23 RX ORDER — PREDNISONE 20 MG/1
60 TABLET ORAL ONCE
Status: COMPLETED | OUTPATIENT
Start: 2022-05-23 | End: 2022-05-23

## 2022-05-23 RX ORDER — KETOROLAC TROMETHAMINE 30 MG/ML
30 INJECTION, SOLUTION INTRAMUSCULAR; INTRAVENOUS ONCE
Status: COMPLETED | OUTPATIENT
Start: 2022-05-23 | End: 2022-05-23

## 2022-05-23 RX ADMIN — KETOROLAC TROMETHAMINE 30 MG: 30 INJECTION, SOLUTION INTRAMUSCULAR at 19:30

## 2022-05-23 RX ADMIN — PREDNISONE 60 MG: 20 TABLET ORAL at 19:30

## 2022-05-23 ASSESSMENT — LIFESTYLE VARIABLES
HOW MANY STANDARD DRINKS CONTAINING ALCOHOL DO YOU HAVE ON A TYPICAL DAY: 5 OR 6
HOW OFTEN DO YOU HAVE A DRINK CONTAINING ALCOHOL: 4 OR MORE TIMES A WEEK

## 2022-05-23 ASSESSMENT — PAIN DESCRIPTION - ORIENTATION: ORIENTATION: LOWER

## 2022-05-23 ASSESSMENT — PAIN DESCRIPTION - LOCATION: LOCATION: BACK

## 2022-05-23 NOTE — ED NOTES
Call to access center for consult to neurosurgery.   Diagnosis L2 compression fracture     Alexandrea Walters RN  05/23/22 1956

## 2022-05-23 NOTE — ED NOTES
EMD and this RN to bedside. Pt states drinks a lot of alcohol due to chronic back pain. Pain across lower back at 10 when moving-at 5 when lying down. Madelin Abler out very loudly when moving around. Up again to side of bed to show how much it hurts to do that. Voids again 250cc clear pale yellow urine.      Елена Sloan, CHLOÉ  05/23/22 4704

## 2022-05-23 NOTE — ED NOTES
Brought by [facility]. Report to [employer]. Reports back pain. Pt able to walk to and from ambulance stretcher.      Kaveh Baron RN  05/23/22 0067

## 2022-05-23 NOTE — ED PROVIDER NOTES
CHIEF COMPLAINT  Back Pain ( Pain across lower back at 10 when moving-at 5 when lying down.  )      HISTORY OF PRESENT ILLNESS  Cristina De La Rosa is a 46 y.o. male who  has a past medical history of Asthma, COPD (chronic obstructive pulmonary disease) ([facility] and ETOH abuse. presents to the ED complaining of acute on chronic low back pain. Patient states he has history of chronic back pain. States his back pain is bilateral and occasionally radiates down the back of his legs. Denies any falls or trauma. No numbness or weakness. Denies any bowel or bladder incontinence. No fevers or chills. States he does drink alcohol daily and had 4-5 shots of vodka today. Denies following with a back specialist despite his symptoms being chronic. States that he only has Medicaid and cannot afford to see a specialist.  States has been taking Tylenol at home with some relief. No other complaints, modifying factors or associated symptoms. Nursing notes reviewed. Past Medical History:   Diagnosis Date    Asthma     COPD (chronic obstructive pulmonary disease) ([facility]     ETOH abuse      Past Surgical History:   Procedure Laterality Date    WRIST SURGERY Right      History reviewed. No pertinent family history. Social History     Socioeconomic History    Marital status: Single     Spouse name: Not on file    Number of children: Not on file    Years of education: Not on file    Highest education level: Not on file   Occupational History    Not on file   Tobacco Use    Smoking status: Current Every Day Smoker     Packs/day: 0.25     Types: Cigarettes    Smokeless tobacco: Never Used   Vaping Use    Vaping Use: Never used   Substance and Sexual Activity    Alcohol use:  Yes     Alcohol/week: 12.0 standard drinks     Types: 12 Cans of beer per week     Comment: 5/23/22at least 6 shots vodka a day   (then says binge drinks and takes 3 days off) KK    Drug use: Never    Sexual activity: Not Currently Other Topics Concern    Not on file   Social History Narrative    Not on file     Social Determinants of Health     Financial Resource Strain:     Difficulty of Paying Living Expenses: Not on file   Food Insecurity:     Worried About Running Out of Food in the Last Year: Not on file    Brandon of Food in the Last Year: Not on file   Transportation Needs:     Lack of Transportation (Medical): Not on file    Lack of Transportation (Non-Medical): Not on file   Physical Activity:     Days of Exercise per Week: Not on file    Minutes of Exercise per Session: Not on file   Stress:     Feeling of Stress : Not on file   Social Connections:     Frequency of Communication with Friends and Family: Not on file    Frequency of Social Gatherings with Friends and Family: Not on file    Attends Church Services: Not on file    Active Member of [address] [place] [address] or Organizations: Not on file    Attends Club or Organization Meetings: Not on file    Marital Status: Not on file   Intimate Partner Violence:     Fear of Current or Ex-Partner: Not on file    Emotionally Abused: Not on file    Physically Abused: Not on file    Sexually Abused: Not on file   Housing Stability:     Unable to Pay for Housing in the Last Year: Not on file    Number of [place] in the Last Year: Not on file    Unstable Housing in the Last Year: Not on file     No current facility-administered medications for this encounter. Current Outpatient Medications   Medication Sig Dispense Refill    albuterol sulfate  (90 Base) MCG/ACT inhaler Inhale 2 puffs into the lungs every 6 hours as needed for Wheezing      lidocaine (LIDODERM) 5 % Place 1 patch onto the skin daily for 10 days 12 hours on, 12 hours off.  10 patch 0    acetaminophen (TYLENOL) 325 MG tablet Take 2 tablets by mouth every 6 hours as needed for Pain 30 tablet 0    ibuprofen (ADVIL;MOTRIN) 600 MG tablet Take 1 tablet by mouth every 6 hours as needed for Pain 30 tablet 0  albuterol (PROVENTIL) (5 MG/ML) 0.5% nebulizer solution Take 0.5 mLs by nebulization every 6 hours as needed for Wheezing 30 vial 0     Allergies   Allergen Reactions    Codeine Itching    Pcn [Penicillins]          REVIEW OF SYSTEMS  (up to 7 for level 4, 8 or more for level 5) pertinent positives per HPI otherwise noted to be negative    PHYSICAL EXAM  BP (!) 130/94   Pulse 126   Temp 98.2 °F (36.8 °C) (Oral)   Resp 16   Ht 5' 7.5\" (1.715 m)   Wt 149 lb 12.8 oz (67.9 kg)   SpO2 99%   BMI 23.12 kg/m²      CONSTITUTIONAL: AOx4, cooperative with exam, afebrile, no slurred speech   HEAD: normocephalic, atraumatic   EYES: PERRL, EOMI, anicteric sclera   ENT: Moist mucous membranes   BACK: Symmetric, no deformity, no midline tenderness of the cervical, thoracic or lumbar spine, paraspinal lumbar tenderness bilaterally, no step-offs   LUNGS: Bilateral breath sounds, CTAB, no rales/ronchi/wheezes   CARDIOVASCULAR: RRR, normal S1/S2, no m/r/g, 2+ pulses throughout   ABDOMEN: Soft, non-tender, non-distended, +BS   NEUROLOGIC:  MAEx4, 5/5 strength throughout; fine touch sensation intact throughout; antalgic gait, GCS 15, normal sensation medial thighs, Achilles tendon patella tendon reflexes 2+   MUSCULOSKELETAL: No clubbing, cyanosis or edema, DP pulse 2+ bilaterally   SKIN: No rash, pallor or wounds on exposed surfaces         RADIOLOGY  X-RAYS:  I have reviewed radiologic plain film image(s). ALL OTHER NON-PLAIN FILM IMAGES SUCH AS CT, ULTRASOUND AND MRI HAVE BEEN READ BY THE RADIOLOGIST. XR LUMBAR SPINE (2-3 VIEWS)   Final Result   1. Acute superior endplate compression deformity L2 appears unchanged; no   change in alignment. 2. Stable grade 1 anterolisthesis L5 on S1.   3. Multilevel degenerative disc disease of mild severity. 4. Osteoarthritis facet joints throughout mild severity. 5. Bones appear osteoporotic. XR LUMBAR SPINE (2-3 VIEWS)   Final Result   1.  Multilevel degenerative disc disease and facet joint arthropathy   2. There appears to be an acute compression deformity of the superior   endplate of L2                EKG INTERPRETATION  None    PROCEDURES    ED COURSE/MDM  Sciatica, muscle strain, muscle spasm, degenerative disc disease  Patient seen and evaluated. History and physical as above. Nontoxic, afebrile. Patient presents complaining of acute on chronic low back pain. Does have some lower paralumbar tenderness bilaterally. There is no acute findings on high-sensitivity neuro exam to suggest cauda equina, epidural abscess, epidural hematoma or cord compression. Symptoms are chronic. Has not had imaging recently therefore will obtain plain film of the lumbar spine today. Toradol for pain control. ED Course as of 05/23/22 2139   Mon May 23, 2022   1950 Patient's plain film of the lumbar spine shows concerns for L2 superior endplate compression fracture. Consult placed to neurosurgery to discuss patient care plan. Patient updated on results. Patient denies any falls recently. States he has been doing activity as normal. [DS]   2019 Spoke with Dr. Chi Ramirez, Neurosurgery, regarding the L2 compression fracture. He recommends an LSO brace. States patient can follow-up with him after brace is placed today. Will provide information for follow-up. Also recommends having upright films with brace in place. [DS]   2138 Patient standing plain films without any new findings. Patient is able to stand without difficulty after the brace was placed. Plan for discharge at this time. Patient discharged with Lidoderm patches, Tylenol and ibuprofen. Stressed importance of follow-up with neurosurgery. Return instruction provided. All questions answered prior to discharge.  [DS]      ED Course User Index  [DS] Heaven Fitzgerald MD       I estimate there is LOW risk for ABDOMINAL AORTIC ANEURYSM, CAUDA EQUINA SYNDROME, EPIDURAL MASS LESION, SPINAL STENOSIS, OR HERNIATED DISK CAUSING SEVERE STENOSIS, thus I consider the discharge disposition reasonable. [address] and I have discussed the diagnosis and risks, and we agree with discharging home to follow-up with their primary doctor. We also discussed returning to the Emergency Department immediately if new or worsening symptoms occur. We have discussed the symptoms which are most concerning (e.g., saddle anesthesia, urinary or bowel incontinence or retention, changing or worsening pain) that necessitate immediate return. Patient was given scripts for the following medications. I counseled patient how to take these medications. New Prescriptions    ACETAMINOPHEN (TYLENOL) 325 MG TABLET    Take 2 tablets by mouth every 6 hours as needed for Pain    IBUPROFEN (ADVIL;MOTRIN) 600 MG TABLET    Take 1 tablet by mouth every 6 hours as needed for Pain    LIDOCAINE (LIDODERM) 5 %    Place 1 patch onto the skin daily for 10 days 12 hours on, 12 hours off. CLINICAL IMPRESSION  1. Closed compression fracture of L2 lumbar vertebra, initial encounter ([facility] [address] [facility]    2. Acute exacerbation of chronic low back pain    3. Alcohol abuse        Blood pressure (!) 130/94, pulse 126, temperature 98.2 °F (36.8 °C), temperature source Oral, resp. rate 16, height 5' 7.5\" (1.715 m), weight 149 lb 12.8 oz (67.9 kg), SpO2 99 %. DISPOSITION  Patient was discharged to home in good condition. Rachell Epstein MD  [address] [place] [address] [address]    Go on 5/26/2022  For your scheduled follow-up appointment for your. Khai Quinones [address]  [address]  [phone]    Call in 1 day  For a follow up appointment with Neurosurgery as discussed in the ED. Disclaimer: All medical record entries made by [address] [place] [address] [address] [facility].       (Please note that this note was completed with a voice recognition program. Every attempt was made to edit the dictations, but inevitably there remain words that are mis-transcribed.)            Monica Ang MD  05/23/22 0276

## 2022-05-23 NOTE — ED NOTES
Voided 250cc pale clear yellow urine.   Re Rey out for nurse for urinal     Lilian Liu, RN  05/23/22 4709

## 2022-05-24 NOTE — ED NOTES
Discharge instructions with pt. Bag from O brace has informational sheets inside of it. Encouraged pt to take this home. Explained rx's. Compression fx teaching with pt. Gave pt flyer with addiction service referrals ( addiction services, [facility], and [facility]-also wrote down [employer] phone number for [place])   Pt was open to discussion about trying to stop drinking alcohol earlier in ED visit. Now states , \"I have all those, I don't need them. I've already been to [facility]. \"   Lower back pain at 5.   Waiting for ETA of 0299 Segundo Carranza, RN  05/23/22 1200 [facility]  05/23/22 6502

## 2022-05-24 NOTE — ED NOTES
Message left for emergency on call staff at [address] [place] [address] .   Phone number [phone]  05/23/22 2022

## 2022-05-24 NOTE — ED NOTES
Staff from [facility] called back  At [facility] currently but will be over soon     Wayne Lala RN  05/23/22 2030

## 2022-05-24 NOTE — ED NOTES
Dr Floyd Asher watching pt on camera get into LYFT without any difficulty.        Yaquelin Escobar RN  05/23/22 9346

## 2022-05-24 NOTE — ED NOTES
Brace shop staff member here and fitting pt for LSO brace. Paperwork completed.      Nacho Terrell RN  05/23/22 2100

## 2022-05-24 NOTE — ED NOTES
Yelling out frequently. Wants to smoke, wants a ride home, wants a drink, asking for a beer. Encouraged to lower voice. Brought pt bina and meagan angelo. .    Pain lower back at [facility]  05/23/22 7043

## 2022-05-24 NOTE — ED NOTES
Still very loud. Very disagreeable . Will not use call light. Asks frequently if he can go home. Explained waiting for test results. Pain at 5 at rest-10 when moving around. meds ordered explained and given.      Amirah Tamayo RN  05/23/22 Esther Wren RN  05/23/22 7932

## 2022-05-24 NOTE — ED NOTES
EMD has talked to pt at least twice reviewing xrays, compression fx, and need for brace. Pt again wants to leave, explained that EMD needs to speak to neurosurgery and probably will need to be fitted for back brace. Pt doesn't have a ride home anyway. Yells out and argumentative with staff, including this RN. Then turns around and says that I am his favorite nurse. Asks to shake my hand and profusely expresses thanks for helping him.        Pain at [facility]  05/23/22 0624

## 2022-05-24 NOTE — ED NOTES
[id number] pt is discharged. Pt will not try to call anyone for a ride home. Pt states \"[facility] has all the information you need, call them\". Then pt states to call medicaid and make them take him home. Pt doesn't have an insurance card or phone number for transportation services. This RN printed pt's face sheet , verified his address. internet search for phone number for his insurance company,   Tried calling several numbers, but none in service at this time of the evening. Call to [address] charge RN who will set up [address] ride for pt when she is able.      Yaquelin Escobar RN  05/23/22 7498

## 2022-05-24 NOTE — ED NOTES
[id number] SALEEM will be here in 10 mins. Pt updated. Walking pt out to meet SALEEM. Pt had thrown away all of his papers. This RN took them out of top of [employer] can and gave them to him. Reminding pt that this has phone numbers of neurosurgeon specialist he is being referred to. Also has info about his appt on 5/26 with orthopedic, Dr Sadiq Kathlene. Pt walking out of ED with this RN. Pt given pair of hospital pants to wear home. Pt came to ED wearing long boxer type underwear without any shorts on. As pt leaving, he is telling every staff member and every patient/family member that \"this nurse is an asshole. \"   Once pt outside, pt says thank you to this RN. [employer] observing pt outside ED doors. Pt walking without any difficulty, gait steady.         Fanta Kelley RN  05/23/22 5416

## 2022-05-27 ENCOUNTER — HOSPITAL ENCOUNTER (EMERGENCY)
Age: 52
Discharge: HOME | End: 2022-05-27
Attending: EMERGENCY MEDICINE
Payer: MEDICAID

## 2022-05-27 VITALS
RESPIRATION RATE: 18 BRPM | DIASTOLIC BLOOD PRESSURE: 83 MMHG | HEART RATE: 98 BPM | SYSTOLIC BLOOD PRESSURE: 125 MMHG | TEMPERATURE: 98.3 F | OXYGEN SATURATION: 90 %

## 2022-05-27 DIAGNOSIS — M54.50 ACUTE EXACERBATION OF CHRONIC LOW BACK PAIN: Primary | ICD-10-CM

## 2022-05-27 DIAGNOSIS — G89.29 ACUTE EXACERBATION OF CHRONIC LOW BACK PAIN: Primary | ICD-10-CM

## 2022-05-27 PROCEDURE — 6370000000 HC RX 637 (ALT 250 FOR IP): Performed by: EMERGENCY MEDICINE

## 2022-05-27 PROCEDURE — 94760 N-INVAS EAR/PLS OXIMETRY 1: CPT

## 2022-05-27 PROCEDURE — 99283 EMERGENCY DEPT VISIT LOW MDM: CPT

## 2022-05-27 PROCEDURE — 94640 AIRWAY INHALATION TREATMENT: CPT

## 2022-05-27 RX ORDER — IBUPROFEN 400 MG/1
400 TABLET ORAL ONCE
Status: COMPLETED | OUTPATIENT
Start: 2022-05-27 | End: 2022-05-27

## 2022-05-27 RX ORDER — IPRATROPIUM BROMIDE AND ALBUTEROL SULFATE 2.5; .5 MG/3ML; MG/3ML
1 SOLUTION RESPIRATORY (INHALATION) ONCE
Status: COMPLETED | OUTPATIENT
Start: 2022-05-27 | End: 2022-05-27

## 2022-05-27 RX ADMIN — IBUPROFEN 400 MG: 400 TABLET, FILM COATED ORAL at 00:59

## 2022-05-27 RX ADMIN — IPRATROPIUM BROMIDE AND ALBUTEROL SULFATE 1 AMPULE: .5; 2.5 SOLUTION RESPIRATORY (INHALATION) at 01:17

## 2022-05-27 ASSESSMENT — ENCOUNTER SYMPTOMS
SHORTNESS OF BREATH: 0
BLOOD IN STOOL: 0
WHEEZING: 0
BACK PAIN: 1
DIARRHEA: 0
EYE PAIN: 0
STRIDOR: 0
ANAL BLEEDING: 0
EYE REDNESS: 0
NAUSEA: 0
ABDOMINAL PAIN: 0
VOMITING: 0
CHEST TIGHTNESS: 0
CONSTIPATION: 0
EYE DISCHARGE: 0
RECTAL PAIN: 0
COLOR CHANGE: 0
CHOKING: 0
COUGH: 0
PHOTOPHOBIA: 0
ABDOMINAL DISTENTION: 0
APNEA: 0
EYE ITCHING: 0

## 2022-05-27 ASSESSMENT — PAIN DESCRIPTION - ORIENTATION: ORIENTATION: LOWER;MID

## 2022-05-27 ASSESSMENT — PAIN DESCRIPTION - LOCATION: LOCATION: BACK

## 2022-05-27 ASSESSMENT — PAIN - FUNCTIONAL ASSESSMENT: PAIN_FUNCTIONAL_ASSESSMENT: PREVENTS OR INTERFERES WITH MANY ACTIVE NOT PASSIVE ACTIVITIES

## 2022-05-27 ASSESSMENT — PAIN DESCRIPTION - FREQUENCY: FREQUENCY: CONTINUOUS

## 2022-05-27 ASSESSMENT — PAIN SCALES - GENERAL: PAINLEVEL_OUTOF10: 8

## 2022-05-27 ASSESSMENT — PAIN DESCRIPTION - ONSET: ONSET: ON-GOING

## 2022-05-27 ASSESSMENT — PAIN DESCRIPTION - DESCRIPTORS: DESCRIPTORS: ACHING

## 2022-05-27 ASSESSMENT — PAIN DESCRIPTION - PAIN TYPE: TYPE: CHRONIC PAIN

## 2022-05-27 NOTE — ED NOTES
Pt arrived to ED via Ems with c/o chronic back pain. Pt states he always has back pain and it has worsened over the past couple days. Pt also states he has asthma and needs a breathing treatment.       Journey Sammie Hodgkin, RN  05/27/22 4523

## 2022-05-27 NOTE — ED NOTES
D/C: Order noted for d/c. Pt confirmed d/c paperwork have correct name. Discharge and education instructions reviewed with patient. Teach-back successful. Pt verbalized understanding and signed d/c papers. Pt denied questions at this time. No acute distress noted. Patient instructed to follow-up as noted - return to emergency department if symptoms worsen. Patient verbalized understanding. Discharged per EDMD with discharge instructions. Pt discharged to private vehicle. Patient stable upon departure.  Thanked patient for choosing [facility] for care.     Jeanie [facility]  05/27/22 1292

## 2022-05-27 NOTE — ED PROVIDER NOTES
[address] [facility]      Pt Name: Nani Morris  MRN: [id number]  [place] 1970  Date of evaluation: 5/27/2022  Provider: Estrella Parnell MD    [address] [place] [address]       Chief Complaint   Patient presents with    Back Pain     chronic for 5 years; describes pain as \"ouch\"       HISTORY OF PRESENT ILLNESS    Nani Morris is a 46 y.o. male who presents to the emergency department with back pain. Patient presents with acute on chronic back pain. Was just here few days ago and diagnosed with a compression fracture. Put in an LSO brace. States he is not wearing it because he does not like it. Endorses 10-10 achy pain. States has not taken Tylenol or ibuprofen because he drinks a lot of alcohol and is an alcoholic. Has not taken anything for pain. No weakness, saddle numbness, loss of bowel or bladder function. Pain has not worsened since he left the emergency room. No urinary complaints. No other associated symptoms. Denies new trauma or fall. Nursing Notes were reviewed. Including nursing noted for FM, Surgical History, Past Medical History, Social History, vitals, and allergies; agree with all. REVIEW OF SYSTEMS       Review of Systems   Constitutional: Negative for activity change, appetite change, chills, diaphoresis, fatigue, fever and unexpected weight change. HENT: Negative for congestion, dental problem, drooling, ear discharge and ear pain. Eyes: Negative for photophobia, pain, discharge, redness, itching and visual disturbance. Respiratory: Negative for apnea, cough, choking, chest tightness, shortness of breath, wheezing and stridor. Cardiovascular: Negative for chest pain, palpitations and leg swelling. Gastrointestinal: Negative for abdominal distention, abdominal pain, anal bleeding, blood in stool, constipation, diarrhea, nausea, rectal pain and vomiting.    Endocrine: Negative for cold intolerance and heat intolerance. Genitourinary: Negative for decreased urine volume and urgency. Musculoskeletal: Positive for back pain. Negative for arthralgias. Skin: Negative for color change and pallor. Neurological: Negative for tremors and facial asymmetry. Hematological: Negative for adenopathy. Does not bruise/bleed easily. Psychiatric/Behavioral: Negative for agitation, behavioral problems, confusion and decreased concentration. Except as noted above the remainder of the review of systems was reviewed and negative. PAST MEDICAL HISTORY     Past Medical History:   Diagnosis Date    Asthma     COPD (chronic obstructive pulmonary disease) ([facility] [address]     ETOH abuse        SURGICAL HISTORY       Past Surgical History:   Procedure Laterality Date    WRIST SURGERY Right        CURRENT MEDICATIONS       Previous Medications    ACETAMINOPHEN (TYLENOL) 325 MG TABLET    Take 2 tablets by mouth every 6 hours as needed for Pain    ALBUTEROL (PROVENTIL) (5 MG/ML) 0.5% NEBULIZER SOLUTION    Take 0.5 mLs by nebulization every 6 hours as needed for Wheezing    ALBUTEROL SULFATE  (90 BASE) MCG/ACT INHALER    Inhale 2 puffs into the lungs every 6 hours as needed for Wheezing    IBUPROFEN (ADVIL;MOTRIN) 600 MG TABLET    Take 1 tablet by mouth every 6 hours as needed for Pain    LIDOCAINE (LIDODERM) 5 %    Place 1 patch onto the skin daily for 10 days 12 hours on, 12 hours off. ALLERGIES     Codeine and Pcn [penicillins]    FAMILY HISTORY      History reviewed. No pertinent family history.     SOCIAL HISTORY       Social History     Socioeconomic History    Marital status: Single     Spouse name: None    Number of children: None    Years of education: None    Highest education level: None   Occupational History    None   Tobacco Use    Smoking status: Current Every Day Smoker     Packs/day: 0.25     Types: Cigarettes    Smokeless tobacco: Never Used   Vaping Use    Vaping Use: Never used   Substance and Sexual Activity    Alcohol use: Yes     Alcohol/week: 12.0 standard drinks     Types: 12 Cans of beer per week     Comment: 5/23/22at least 6 shots vodka a day   (then says binge drinks and takes 3 days off) KK    Drug use: Never    Sexual activity: Not Currently   Other Topics Concern    None   Social History Narrative    None     Social Determinants of Health     Financial Resource Strain:     Difficulty of Paying Living Expenses: Not on file   Food Insecurity:     Worried About Running Out of Food in the Last Year: Not on file    [place] of Food in the Last Year: Not on file   Transportation Needs:     Lack of Transportation (Medical): Not on file    Lack of Transportation (Non-Medical): Not on file   Physical Activity:     Days of Exercise per Week: Not on file    Minutes of Exercise per Session: Not on file   Stress:     Feeling of Stress : Not on file   Social Connections:     Frequency of Communication with Friends and Family: Not on file    Frequency of Social Gatherings with Friends and Family: Not on file    Attends Druze Services: Not on file    Active Member of [address] [place] [address] or Organizations: Not on file    Attends Club or Organization Meetings: Not on file    Marital Status: Not on file   Intimate Partner Violence:     Fear of Current or Ex-Partner: Not on file    Emotionally Abused: Not on file    Physically Abused: Not on file    Sexually Abused: Not on file   Housing Stability:     Unable to Pay for Housing in the Last Year: Not on file    Number of [place] in the Last Year: Not on file    Unstable Housing in the Last Year: Not on file       PHYSICAL EXAM       ED Triage Vitals [05/27/22 0030]   BP Temp Temp Source Heart Rate Resp SpO2 Height Weight   125/83 98.3 °F (36.8 °C) Oral 98 18 96 % -- --       Physical Exam  Vitals and nursing note reviewed. Constitutional:       General: He is not in acute distress. Appearance: He is well-developed.  He is not diaphoretic. HENT:      Head: Normocephalic and atraumatic. Eyes:      General:         Right eye: No discharge. Left eye: No discharge. Pupils: Pupils are equal, round, and reactive to light. Neck:      Thyroid: No thyromegaly. Trachea: No tracheal deviation. Cardiovascular:      Rate and Rhythm: Normal rate and regular rhythm. Heart sounds: No murmur heard. Pulmonary:      Breath sounds: No wheezing or rales. Chest:      Chest wall: No tenderness. Abdominal:      General: There is no distension. Palpations: Abdomen is soft. There is no mass. Tenderness: There is no abdominal tenderness. There is no guarding or rebound. Musculoskeletal:         General: No tenderness or deformity. Normal range of motion. Cervical back: Normal range of motion. Skin:     General: Skin is warm. Coloration: Skin is not pale. Findings: No erythema or rash. Neurological:      Mental Status: He is alert. Cranial Nerves: No cranial nerve deficit. Motor: No abnormal muscle tone. Coordination: Coordination normal.         DIAGNOSTIC RESULTS     EKG: All EKG's are interpreted by the Emergency Department Physician who either signs or Co-signs this chart in the absence of acardiologist.    None    RADIOLOGY:   Non-plain film images such as CT, Ultrasoundand MRI are read by the radiologist. Plain radiographic images are visualized and preliminarily interpreted by the emergency physician with the below findings:    None    ED BEDSIDE ULTRASOUND:   Performed by ED Physician - none    LABS:  Labs Reviewed - No data to display    All other labs were withinnormal range or not returned as of this dictation.     EMERGENCY DEPARTMENT COURSE and DIFFERENTIAL DIAGNOSIS/MDM:     PMH, Surgical Hx, FH, Social Hx reviewed by myself (ETOH usage, Tobacco usage, Drug usage reviewed by myself, no pertinent Hx)- No Pertinent Hx     Old records were reviewed by me 47-year-old with acute on chronic lower back pain. Was just here and had an L1 compression fracture. No cord compression symptoms. He is not wearing his L LSO brace. He states he does not want a wear. I told the importance of wearing it. He has an appointment with spine on June 1. Recommended him following up. Told he can take ibuprofen even if he is an alcoholic. Strict return precautions discussed. Patient verbalized understanding. I estimate there is LOW risk for Sepsis, MI, Stroke, Tamponade, PTX, Toxicity or other life threatening etiology thus I consider the discharge disposition reasonable. The patient is at low risk for mortality based on demographic, history and clinical factors. Given the best available information and clinical assessment, I estimate the risk of hospitalization to be greater than risk of treatment at home. I have explained to the patient that the risk could rapidly change, given precautions for return and instructions. Explained to patient that the risk for mortality is low based on demographic, history and clinical factors. I discussed with patient the results of evaluation in the ED, diagnosis, care, and prognosis. The plan is to discharge to home. Patient is in agreement with plan and questions have been answered. I also discussed with patient the reasons which may require a return visit and the importance of follow-up care. The patient is well-appearing, nontoxic, and improved at the time of discharge. Patient agrees to call to arrange follow-up care as directed. Patient understands to return immediately for worsening/change in symptoms. CRITICAL CARE TIME   Total Critical Caretime was 0 minutes, excluding separately reportable procedures. There was a high probability of clinically significant/life threatening deterioration in the patient's condition which required my urgent intervention.         PROCEDURES:  Unlessotherwise noted below, none    FINAL IMPRESSION      1.  Acute exacerbation of chronic low back pain          DISPOSITION/PLAN   DISPOSITION Discharge - Pending Orders Complete 05/27/2022 01:04:41 AM    PATIENT REFERRED TO:  [place] [address]  [phone]    Call today        DISCHARGE MEDICATIONS:  New Prescriptions    No medications on file          (Please note that portions ofthis note were completed with a voice recognition program.  Efforts were made to edit the dictations but occasionally words are mis-transcribed.)    Ayana Rangel MD(electronically signed)  Attending Emergency Physician        Ayana Rangel MD  05/27/22 7955

## 2022-06-12 ENCOUNTER — APPOINTMENT (OUTPATIENT)
Dept: CT IMAGING | Age: 52
End: 2022-06-12
Payer: MEDICAID

## 2022-06-12 ENCOUNTER — HOSPITAL ENCOUNTER (EMERGENCY)
Age: 52
Discharge: HOME | End: 2022-06-12
Attending: EMERGENCY MEDICINE
Payer: MEDICAID

## 2022-06-12 VITALS
SYSTOLIC BLOOD PRESSURE: 155 MMHG | BODY MASS INDEX: 24.69 KG/M2 | OXYGEN SATURATION: 96 % | RESPIRATION RATE: 14 BRPM | TEMPERATURE: 98 F | WEIGHT: 160 LBS | HEART RATE: 98 BPM | DIASTOLIC BLOOD PRESSURE: 87 MMHG

## 2022-06-12 DIAGNOSIS — S32.020D CLOSED COMPRESSION FRACTURE OF L2 LUMBAR VERTEBRA WITH ROUTINE HEALING, SUBSEQUENT ENCOUNTER: Primary | ICD-10-CM

## 2022-06-12 DIAGNOSIS — M54.50 ACUTE BILATERAL LOW BACK PAIN WITHOUT SCIATICA: ICD-10-CM

## 2022-06-12 LAB
A/G RATIO: 2 (ref 1.1–2.2)
ACETAMINOPHEN LEVEL: <5 UG/ML (ref 10–30)
ALBUMIN SERPL-MCNC: 4.8 G/DL (ref 3.4–5)
ALP BLD-CCNC: 91 U/L (ref 40–129)
ALT SERPL-CCNC: 16 U/L (ref 10–40)
AMPHETAMINE SCREEN, URINE: NORMAL
ANION GAP SERPL CALCULATED.3IONS-SCNC: 15 MMOL/L (ref 3–16)
AST SERPL-CCNC: 25 U/L (ref 15–37)
BARBITURATE SCREEN URINE: NORMAL
BASOPHILS ABSOLUTE: 0.1 K/UL (ref 0–0.2)
BASOPHILS RELATIVE PERCENT: 1 %
BENZODIAZEPINE SCREEN, URINE: NORMAL
BILIRUB SERPL-MCNC: <0.2 MG/DL (ref 0–1)
BILIRUBIN URINE: NEGATIVE
BLOOD, URINE: NEGATIVE
BUN BLDV-MCNC: 8 MG/DL (ref 7–20)
CALCIUM SERPL-MCNC: 8.7 MG/DL (ref 8.3–10.6)
CANNABINOID SCREEN URINE: NORMAL
CHLORIDE BLD-SCNC: 104 MMOL/L (ref 99–110)
CLARITY: CLEAR
CO2: 22 MMOL/L (ref 21–32)
COCAINE METABOLITE SCREEN URINE: NORMAL
COLOR: YELLOW
CREAT SERPL-MCNC: 0.8 MG/DL (ref 0.9–1.3)
EOSINOPHILS ABSOLUTE: 0.3 K/UL (ref 0–0.6)
EOSINOPHILS RELATIVE PERCENT: 5.7 %
GFR AFRICAN AMERICAN: >60
GFR NON-AFRICAN AMERICAN: >60
GLUCOSE BLD-MCNC: 105 MG/DL (ref 70–99)
GLUCOSE URINE: NEGATIVE MG/DL
HCT VFR BLD CALC: 41.1 % (ref 40.5–52.5)
HEMOGLOBIN: 14.2 G/DL (ref 13.5–17.5)
KETONES, URINE: NEGATIVE MG/DL
LACTIC ACID: 1.9 MMOL/L (ref 0.4–2)
LACTIC ACID: 2.6 MMOL/L (ref 0.4–2)
LEUKOCYTE ESTERASE, URINE: NEGATIVE
LYMPHOCYTES ABSOLUTE: 2.5 K/UL (ref 1–5.1)
LYMPHOCYTES RELATIVE PERCENT: 49.1 %
Lab: NORMAL
MCH RBC QN AUTO: 34.6 PG (ref 26–34)
MCHC RBC AUTO-ENTMCNC: 34.5 G/DL (ref 31–36)
MCV RBC AUTO: 100.2 FL (ref 80–100)
METHADONE SCREEN, URINE: NORMAL
MICROSCOPIC EXAMINATION: NORMAL
MONOCYTES ABSOLUTE: 0.4 K/UL (ref 0–1.3)
MONOCYTES RELATIVE PERCENT: 8.3 %
NEUTROPHILS ABSOLUTE: 1.8 K/UL (ref 1.7–7.7)
NEUTROPHILS RELATIVE PERCENT: 35.9 %
NITRITE, URINE: NEGATIVE
OPIATE SCREEN URINE: NORMAL
OXYCODONE URINE: NORMAL
PDW BLD-RTO: 14.1 % (ref 12.4–15.4)
PH UA: 5.5
PH UA: 5.5 (ref 5–8)
PHENCYCLIDINE SCREEN URINE: NORMAL
PLATELET # BLD: 218 K/UL (ref 135–450)
PMV BLD AUTO: 7.4 FL (ref 5–10.5)
POTASSIUM SERPL-SCNC: 3.5 MMOL/L (ref 3.5–5.1)
PROPOXYPHENE SCREEN: NORMAL
PROTEIN UA: NEGATIVE MG/DL
RBC # BLD: 4.1 M/UL (ref 4.2–5.9)
SALICYLATE, SERUM: <0.3 MG/DL (ref 15–30)
SODIUM BLD-SCNC: 141 MMOL/L (ref 136–145)
SPECIFIC GRAVITY UA: 1.01 (ref 1–1.03)
TOTAL PROTEIN: 7.2 G/DL (ref 6.4–8.2)
TROPONIN: <0.01 NG/ML
URINE REFLEX TO CULTURE: NORMAL
URINE TYPE: NORMAL
UROBILINOGEN, URINE: 0.2 E.U./DL
WBC # BLD: 5.1 K/UL (ref 4–11)

## 2022-06-12 PROCEDURE — 72131 CT LUMBAR SPINE W/O DYE: CPT

## 2022-06-12 PROCEDURE — 93005 ELECTROCARDIOGRAM TRACING: CPT | Performed by: NURSE PRACTITIONER

## 2022-06-12 PROCEDURE — 80307 DRUG TEST PRSMV CHEM ANLYZR: CPT

## 2022-06-12 PROCEDURE — 80053 COMPREHEN METABOLIC PANEL: CPT

## 2022-06-12 PROCEDURE — 83605 ASSAY OF LACTIC ACID: CPT

## 2022-06-12 PROCEDURE — 85025 COMPLETE CBC W/AUTO DIFF WBC: CPT

## 2022-06-12 PROCEDURE — 94761 N-INVAS EAR/PLS OXIMETRY MLT: CPT

## 2022-06-12 PROCEDURE — 84484 ASSAY OF TROPONIN QUANT: CPT

## 2022-06-12 PROCEDURE — 80179 DRUG ASSAY SALICYLATE: CPT

## 2022-06-12 PROCEDURE — 94640 AIRWAY INHALATION TREATMENT: CPT

## 2022-06-12 PROCEDURE — 80143 DRUG ASSAY ACETAMINOPHEN: CPT

## 2022-06-12 PROCEDURE — 6370000000 HC RX 637 (ALT 250 FOR IP): Performed by: NURSE PRACTITIONER

## 2022-06-12 PROCEDURE — 2580000003 HC RX 258: Performed by: NURSE PRACTITIONER

## 2022-06-12 PROCEDURE — 99284 EMERGENCY DEPT VISIT MOD MDM: CPT

## 2022-06-12 PROCEDURE — 81003 URINALYSIS AUTO W/O SCOPE: CPT

## 2022-06-12 PROCEDURE — 96360 HYDRATION IV INFUSION INIT: CPT

## 2022-06-12 PROCEDURE — 36415 COLL VENOUS BLD VENIPUNCTURE: CPT

## 2022-06-12 RX ORDER — LORAZEPAM 2 MG/ML
1 INJECTION INTRAMUSCULAR ONCE
Status: DISCONTINUED | OUTPATIENT
Start: 2022-06-12 | End: 2022-06-12

## 2022-06-12 RX ORDER — 0.9 % SODIUM CHLORIDE 0.9 %
2000 INTRAVENOUS SOLUTION INTRAVENOUS ONCE
Status: COMPLETED | OUTPATIENT
Start: 2022-06-12 | End: 2022-06-12

## 2022-06-12 RX ORDER — LIDOCAINE 50 MG/G
1 PATCH TOPICAL DAILY
Qty: 10 PATCH | Refills: 0 | Status: SHIPPED | OUTPATIENT
Start: 2022-06-12 | End: 2022-06-15

## 2022-06-12 RX ORDER — FOLIC ACID 1 MG/1
1 TABLET ORAL DAILY
Status: DISCONTINUED | OUTPATIENT
Start: 2022-06-12 | End: 2022-06-12 | Stop reason: HOSPADM

## 2022-06-12 RX ORDER — IPRATROPIUM BROMIDE AND ALBUTEROL SULFATE 2.5; .5 MG/3ML; MG/3ML
1 SOLUTION RESPIRATORY (INHALATION) ONCE
Status: COMPLETED | OUTPATIENT
Start: 2022-06-12 | End: 2022-06-12

## 2022-06-12 RX ORDER — MULTIVITAMIN WITH IRON
1 TABLET ORAL DAILY
Status: DISCONTINUED | OUTPATIENT
Start: 2022-06-12 | End: 2022-06-12 | Stop reason: HOSPADM

## 2022-06-12 RX ORDER — GAUZE BANDAGE 2" X 2"
100 BANDAGE TOPICAL DAILY
Status: DISCONTINUED | OUTPATIENT
Start: 2022-06-12 | End: 2022-06-12 | Stop reason: HOSPADM

## 2022-06-12 RX ORDER — LIDOCAINE 4 G/G
1 PATCH TOPICAL ONCE
Status: DISCONTINUED | OUTPATIENT
Start: 2022-06-12 | End: 2022-06-12 | Stop reason: HOSPADM

## 2022-06-12 RX ADMIN — THIAMINE HCL TAB 100 MG 100 MG: 100 TAB at 15:47

## 2022-06-12 RX ADMIN — THERA TABS 1 TABLET: TAB at 15:47

## 2022-06-12 RX ADMIN — IPRATROPIUM BROMIDE AND ALBUTEROL SULFATE 1 AMPULE: .5; 3 SOLUTION RESPIRATORY (INHALATION) at 17:08

## 2022-06-12 RX ADMIN — FOLIC ACID 1 MG: 1 TABLET ORAL at 15:47

## 2022-06-12 RX ADMIN — SODIUM CHLORIDE 2000 ML: 9 INJECTION, SOLUTION INTRAVENOUS at 15:47

## 2022-06-12 ASSESSMENT — LIFESTYLE VARIABLES: HOW OFTEN DO YOU HAVE A DRINK CONTAINING ALCOHOL: 4 OR MORE TIMES A WEEK

## 2022-06-12 NOTE — ED PROVIDER NOTES
[address]  [address] [address]  Dept: [phone]  Loc: [address] [place] [address] COMPLAINT    Chief Complaint   Patient presents with    Fall     Patient arrives via ems with c/o Fall 5 weeks ago. Patient states back pain since. Lidocaine patch and ibu works well for patient. HPI    Kayden Andre is a 46 y.o. male who presents to the emergency department via EMS with ongoing complaints of low back pain. The patient has been drinking alcohol today and is concerned that he cannot stop hiccuping. On arrival the patient is cantankerous and uncooperative at times but he ultimately denies lightheadedness, dizziness, shortness of breath, chest pain, abdominal pain, nausea or vomiting. He states \"I cannot stop hiccuping. \"  And then he makes purposeful guttural noises. He states he fell about 5 weeks ago. He has low back pain. He states he did an x-ray and it did not show anything. He states he been taking ibuprofen and lidocaine patches and its been helping and he is now on lidocaine patch. He denies that the pain radiates into the extremities and he denies extremity weakness, numbness or paresthesias. REVIEW OF SYSTEMS    General: No fevers or chills  GI: No abdominal pain or vomiting  : No dysuria or hematuria  Musculoskeletal: see HPI  Neurologic: No bowel or bladder incontinence, No saddle anesthesia, No leg weakness  All other systems reviewed and are negative.     PAST MEDICAL & SURGICAL HISTORY    Past Medical History:   Diagnosis Date    Asthma     COPD (chronic obstructive pulmonary disease) ([facility] [address]     ETOH abuse      Past Surgical History:   Procedure Laterality Date    WRIST SURGERY Right        CURRENT MEDICATIONS  (may include discharge medications prescribed in the ED)  Current Outpatient Rx   Medication Sig Dispense Refill    lidocaine (LIDODERM) 5 % Place 1 patch onto the skin daily for 10 days 12 hours on, 12 hours off. 10 patch 0    albuterol sulfate  (90 Base) MCG/ACT inhaler Inhale 2 puffs into the lungs every 6 hours as needed for Wheezing      acetaminophen (TYLENOL) 325 MG tablet Take 2 tablets by mouth every 6 hours as needed for Pain 30 tablet 0    ibuprofen (ADVIL;MOTRIN) 600 MG tablet Take 1 tablet by mouth every 6 hours as needed for Pain 30 tablet 0    albuterol (PROVENTIL) (5 MG/ML) 0.5% nebulizer solution Take 0.5 mLs by nebulization every 6 hours as needed for Wheezing 30 vial 0       ALLERGIES    Allergies   Allergen Reactions    Codeine Itching    Pcn [Penicillins]        SOCIAL HISTORY    Social History     Socioeconomic History    Marital status: Single     Spouse name: None    Number of children: None    Years of education: None    Highest education level: None   Occupational History    None   Tobacco Use    Smoking status: Current Every Day Smoker     Packs/day: 0.25     Types: Cigarettes    Smokeless tobacco: Never Used   Vaping Use    Vaping Use: Never used   Substance and Sexual Activity    Alcohol use: Yes     Alcohol/week: 12.0 standard drinks     Types: 12 Cans of beer per week     Comment: 5/23/22at least 6 shots vodka a day   (then says binge drinks and takes 3 days off) KK    Drug use: Never    Sexual activity: Not Currently   Other Topics Concern    None   Social History Narrative    None     Social Determinants of Health     Financial Resource Strain:     Difficulty of Paying Living Expenses: Not on file   Food Insecurity:     Worried About Running Out of Food in the Last Year: Not on file    [place] of Food in the Last Year: Not on file   Transportation Needs:     Lack of Transportation (Medical): Not on file    Lack of Transportation (Non-Medical):  Not on file   Physical Activity:     Days of Exercise per Week: Not on file    Minutes of Exercise per Session: Not on file   Stress:  Feeling of Stress : Not on file   Social Connections:     Frequency of Communication with Friends and Family: Not on file    Frequency of Social Gatherings with Friends and Family: Not on file    Attends [employer] Services: Not on file    Active Member of Clubs or Organizations: Not on file    Attends Club or Organization Meetings: Not on file    Marital Status: Not on file   Intimate Partner Violence:     Fear of Current or Ex-Partner: Not on file    Emotionally Abused: Not on file    Physically Abused: Not on file    Sexually Abused: Not on file   Housing Stability:     Unable to Pay for Housing in the Last Year: Not on file    Number of [place] in the Last Year: Not on file    Unstable Housing in the Last Year: Not on file       PHYSICAL EXAM    VITAL SIGNS: BP (!) 143/85   Pulse 97   Temp 98 °F (36.7 °C) (Oral)   Resp 18   Wt 160 lb (72.6 kg)   SpO2 97%   BMI 24.69 kg/m²    Constitutional:  Well developed, well nourished, no acute distress  HENT:  Atraumatic, moist mucus membranes  Neck: No JVD, supple   Respiratory:  No respiratory distress, normal breath sounds  Cardiovascular: Tachycardic rate in the 118 range during my initial evaluation. Regular rhythm. S1-S2. No murmurs  GI:  Soft, nontender, no pulsatile masses or bruits of the abdomen  Musculoskeletal:  No edema, no acute deformities    Back: no midline C-spine, T-spine or L-spine pain with palpation. No bony arise, step-offs or crepitance. No parathoracic, paracervical or paralumbar pain with palpation. Negative straight leg raises. No CVA flank tenderness  Integument:  Well hydrated, no rash. No ecchymosis noted to the torso and back. No abrasions. No areas of erythema. Vascular: Radial, pedal and posttibial pulses 2+ equal bilaterally. Brisk capillary refill to fingers and toes. Extremities warm natural color. No peripheral edema noted.   Neurologic:  Motor testing reveals: intact thigh adduction, knee flexion and extension, ankle dorsiflexion, toe plantarflexion, and great toe dorsiflexion bilaterally, patellar and Achilles tendon reflexes are 2+ and equal bilaterally, sensation to light touch is intact in the groin and lower extremities bilaterally. No tremors. Gait is steady. RADIOLOGY  CT LUMBAR SPINE WO CONTRAST   Final Result   1. Subacute L2 compression fracture with mild interval loss of height since   the prior radiograph. 2. No significant retropulsion. 3. Degenerative changes are otherwise present throughout the lumbar spine as   above. Labs Reviewed   CBC WITH AUTO DIFFERENTIAL - Abnormal; Notable for the following components:       Result Value    RBC 4.10 (*)     .2 (*)     MCH 34.6 (*)     All other components within normal limits   COMPREHENSIVE METABOLIC PANEL - Abnormal; Notable for the following components:    Glucose 105 (*)     CREATININE 0.8 (*)     All other components within normal limits   LACTIC ACID - Abnormal; Notable for the following components:    Lactic Acid 2.6 (*)     All other components within normal limits   ACETAMINOPHEN LEVEL - Abnormal; Notable for the following components:    Acetaminophen Level <5 (*)     All other components within normal limits   SALICYLATE LEVEL - Abnormal; Notable for the following components:    Salicylate, Serum <4.1 (*)     All other components within normal limits   URINALYSIS WITH REFLEX TO CULTURE   URINE DRUG SCREEN   TROPONIN   LACTIC ACID   LACTIC ACID       ED COURSE & MEDICAL DECISION MAKING    Please see EMR for medications administered in the ED.   Medications   0.9 % sodium chloride bolus (0 mLs IntraVENous Stopped 6/12/22 1647)   ipratropium-albuterol (DUONEB) nebulizer solution 1 ampule (1 ampule Inhalation Given 6/12/22 1708)     This patient was seen evaluated by myself my attending physician Dr. Dayton Eubanks    Differential diagnosis includes but is not limited to alcohol intoxication, other substance abuse, dehydration, electrolyte derangement, renal failure, acidosis, other. He has been drinking today. He has a history of binge drinking. He is an unreliable source and a poor historian and at times is cantankerous and abusive to the staff. He was afebrile but initially tachycardic so I worked him up. Is a white count of 5.1 with a hemoglobin of 14.2. Electrolytes are unremarkable. He does have a lactic acid of 2.6. He was given 2 L of IV fluid. The tachycardia resolved after the fluid bolus and his repeat lactic acid was 1.9. No acute changes on EKG. Troponin less than 0.01. His CO2 on his chemistry panel was 22. His urine tox screen is negative. Acetaminophen and salicylate levels are unremarkable. Urinalysis does not indicate infection. CT of the L-spine without  Impression   1. Subacute L2 compression fracture with mild interval loss of height since   the prior radiograph. 2. No significant retropulsion. 3. Degenerative changes are otherwise present throughout the lumbar spine as   above.         He is neurologically intact. We will prescribe him further lidocaine patches and refer him to Dr. aJzlyn Cramer who is his physician. He was otherwise instructed to return to the emergency department for worsening symptoms. Patient verbalized understanding of the discharge instructions. FINAL IMPRESSION    1. Closed compression fracture of L2 lumbar vertebra with routine healing, subsequent encounter    2.  Acute bilateral low back pain without sciatica        PLAN  Discharge with outpatient follow-up (see EMR)      (Please note that this note was completed with a voice recognition program.  Every attempt was made to edit the dictations, but inevitably there remain words that are mis-transcribed.)              Manuela Suggs, MACARIO - TENZIN  06/14/22 0047

## 2022-06-12 NOTE — ED PROVIDER NOTES
Attending Supervisory Note/Shared Visit   I have personally performed a face to face diagnostic evaluation on this patient. I have reviewed the mid-levels findings and agree. History and Exam by me shows alert white male no acute distress. He has some chronic back pain. He fell 5 weeks ago. He has had worsening back pain since the fall. Generally gets relief with ibuprofen lidocaine patches. He is followed by Dr. Sravani Thomas. General: Alert thin white male no acute distress. Head: Atraumatic and normocephalic. Neck: Supple, nontender. Heart: Regular rate and rhythm. No murmurs or gallops noted. Lungs: Breath sounds equal bilaterally and clear. Abdomen: Soft, nondistended, nontender. Musculoskeletal: No focal tenderness in his back. Some straightening of his lumbar lordosis. He sits up or stands and moves around without any apparent difficulty. He has normal range of motion of his upper lower extremities. EKG: Sinus tachycardia, rate of 113, nonspecific T wave changes. Rhythm strip shows sinus tachycardia with a rate of 113, NM interval of 158 ms, QRS 78 ms with no other ectopy as interpreted by me. This is compared to an EKG dated 11/6/2020, no significant changes noted. Lab reviewed. H&H of 14.2 and 41.1. White blood cell count 5100 with 36 neutrophils and 49 lymphs. Electrolytes BUN and creatinine are normal.  Liver enzymes are normal.  Urinalysis is unremarkable. Drug screen is negative. Troponin less than 0.01. Lactic acid 2.6. Acetaminophen less than 5. Troponin less than 0.3. CT lumbar spine: Subacute L2 compression fracture with mild interval loss of height since the prior radiograph. No significant retropulsion. No other acute findings. Degenerative changes present throughout the lumbar spine. This patient has no neurologic deficits. He moves fine. He does not appear to be in significant pain. He likely has a subacute fracture from his fall 5 weeks ago.   He has a physician that he follows with for his chronic back pain. His pain will be managed conservatively and he will be discharged for outpatient follow-up.       (Please note that portions of this note were completed with a voice recognition program.  Efforts were made to edit the dictations but occasionally words are mis-transcribed.)    Dian Rubin MD  Attending Emergency Physician        Edna Danielle MD  06/12/22 8717

## 2022-06-12 NOTE — ED NOTES
Patient given sandwich. Ate entire sandwich. Able to ambulate independently. Able to perform complex functions.      Jesse Carson RN  06/12/22 6737

## 2022-06-12 NOTE — ED NOTES
Pt desiring to leave at this time. States he will be calling a medicaid ride home. Patient discharged with instructions provided. Left with steady even gait.       Saima Armijo RN  06/12/22 8036

## 2022-06-12 NOTE — ED NOTES
Pt getting verbally aggressive with staff and insulting Security called to bedside. Patient agreeable to maintain appropriate behavior at this time.      Marcela Taveras RN  06/12/22 4721

## 2022-06-13 LAB
EKG ATRIAL RATE: 113 BPM
EKG DIAGNOSIS: NORMAL
EKG P AXIS: 61 DEGREES
EKG P-R INTERVAL: 158 MS
EKG Q-T INTERVAL: 322 MS
EKG QRS DURATION: 78 MS
EKG QTC CALCULATION (BAZETT): 441 MS
EKG R AXIS: 50 DEGREES
EKG T AXIS: 73 DEGREES
EKG VENTRICULAR RATE: 113 BPM

## 2022-06-13 PROCEDURE — 93010 ELECTROCARDIOGRAM REPORT: CPT | Performed by: INTERNAL MEDICINE

## 2022-06-15 ENCOUNTER — HOSPITAL ENCOUNTER (EMERGENCY)
Age: 52
Discharge: HOME | End: 2022-06-15
Attending: EMERGENCY MEDICINE
Payer: MEDICAID

## 2022-06-15 VITALS
DIASTOLIC BLOOD PRESSURE: 76 MMHG | TEMPERATURE: 98.8 F | HEART RATE: 105 BPM | OXYGEN SATURATION: 97 % | SYSTOLIC BLOOD PRESSURE: 139 MMHG | RESPIRATION RATE: 16 BRPM

## 2022-06-15 DIAGNOSIS — F10.920 ACUTE ALCOHOLIC INTOXICATION WITHOUT COMPLICATION (HCC): Primary | ICD-10-CM

## 2022-06-15 PROCEDURE — 99283 EMERGENCY DEPT VISIT LOW MDM: CPT

## 2022-06-15 RX ORDER — LIDOCAINE 4 G/G
1 PATCH TOPICAL DAILY
Qty: 30 PATCH | Refills: 0 | Status: SHIPPED | OUTPATIENT
Start: 2022-06-15 | End: 2022-07-15

## 2022-06-15 ASSESSMENT — ENCOUNTER SYMPTOMS
NAUSEA: 0
STRIDOR: 0
BACK PAIN: 1
EYE REDNESS: 0
PHOTOPHOBIA: 0
COLOR CHANGE: 0
SHORTNESS OF BREATH: 0
BLOOD IN STOOL: 0
CONSTIPATION: 0
EYE DISCHARGE: 0
VOMITING: 0
DIARRHEA: 0
COUGH: 0
WHEEZING: 0
ANAL BLEEDING: 0
EYE PAIN: 0
ABDOMINAL DISTENTION: 0
EYE ITCHING: 0
CHOKING: 0
RECTAL PAIN: 0
CHEST TIGHTNESS: 0
APNEA: 0
ABDOMINAL PAIN: 0

## 2022-06-15 ASSESSMENT — PAIN - FUNCTIONAL ASSESSMENT
PAIN_FUNCTIONAL_ASSESSMENT: 0-10
PAIN_FUNCTIONAL_ASSESSMENT: 0-10
PAIN_FUNCTIONAL_ASSESSMENT: ACTIVITIES ARE NOT PREVENTED

## 2022-06-15 ASSESSMENT — PAIN DESCRIPTION - DESCRIPTORS: DESCRIPTORS: DISCOMFORT

## 2022-06-15 ASSESSMENT — PAIN SCALES - GENERAL
PAINLEVEL_OUTOF10: 10
PAINLEVEL_OUTOF10: 4

## 2022-06-15 ASSESSMENT — PAIN DESCRIPTION - ORIENTATION: ORIENTATION: MID;LOWER

## 2022-06-15 ASSESSMENT — PAIN DESCRIPTION - PAIN TYPE: TYPE: ACUTE PAIN

## 2022-06-15 ASSESSMENT — PAIN DESCRIPTION - LOCATION: LOCATION: BACK

## 2022-06-15 NOTE — ED PROVIDER NOTES
[address] [facility]      Pt Name: Deloris Luo  MRN: [id number]  [place] 1970  Date of evaluation: 6/15/2022  Provider: Nalini Pearson MD    CHIEF COMPLAINT       Chief Complaint   Patient presents with    Back Pain     patient fell 3 weeks ago, continues to have back pain. HISTORY OF PRESENT ILLNESS    Deloris Luo is a 46 y.o. male who presents to the emergency department with back pain. Recent diagnosis of lumbar compression fracture. Supposed to follow-up with spine. Has not yet. They ordered an MRI. He came to the emergency room to get it. He is intoxicated from alcohol use. Endorses 10/10 achy lower back pain. No weakness, saddle numbness, loss of bowel or bladder function. No urinary complaints. No IV drug abuse. No other associated symptoms. No new trauma. Nursing Notes were reviewed. Including nursing noted for FM, Surgical History, Past Medical History, Social History, vitals, and allergies; agree with all. REVIEW OF SYSTEMS       Review of Systems   Constitutional: Negative for activity change, appetite change, chills, diaphoresis, fatigue, fever and unexpected weight change. HENT: Negative for congestion, dental problem, drooling, ear discharge and ear pain. Eyes: Negative for photophobia, pain, discharge, redness, itching and visual disturbance. Respiratory: Negative for apnea, cough, choking, chest tightness, shortness of breath, wheezing and stridor. Cardiovascular: Negative for chest pain, palpitations and leg swelling. Gastrointestinal: Negative for abdominal distention, abdominal pain, anal bleeding, blood in stool, constipation, diarrhea, nausea, rectal pain and vomiting. Endocrine: Negative for cold intolerance and heat intolerance. Genitourinary: Negative for decreased urine volume and urgency. Musculoskeletal: Positive for back pain. Negative for arthralgias. Skin: Negative for color change and pallor. Neurological: Negative for tremors and facial asymmetry. Hematological: Negative for adenopathy. Does not bruise/bleed easily. Psychiatric/Behavioral: Negative for agitation, behavioral problems, confusion and decreased concentration. Except as noted above the remainder of the review of systems was reviewed and negative. PAST MEDICAL HISTORY     Past Medical History:   Diagnosis Date    Asthma     COPD (chronic obstructive pulmonary disease) ([facility] [address]     ETOH abuse        SURGICAL HISTORY       Past Surgical History:   Procedure Laterality Date    WRIST SURGERY Right        CURRENT MEDICATIONS       Discharge Medication List as of 6/15/2022  1:16 AM      CONTINUE these medications which have NOT CHANGED    Details   lidocaine (LIDODERM) 5 % Place 1 patch onto the skin daily for 10 days 12 hours on, 12 hours off., Disp-10 patch, R-0Print      albuterol sulfate  (90 Base) MCG/ACT inhaler Inhale 2 puffs into the lungs every 6 hours as needed for WheezingHistorical Med      albuterol (PROVENTIL) (5 MG/ML) 0.5% nebulizer solution Take 0.5 mLs by nebulization every 6 hours as needed for Wheezing, Disp-30 vial,R-0Print             ALLERGIES     Codeine and Pcn [penicillins]    FAMILY HISTORY      History reviewed. No pertinent family history. SOCIAL HISTORY       Social History     Socioeconomic History    Marital status: Single     Spouse name: None    Number of children: None    Years of education: None    Highest education level: None   Occupational History    None   Tobacco Use    Smoking status: Current Every Day Smoker     Packs/day: 0.25     Types: Cigarettes    Smokeless tobacco: Never Used   Vaping Use    Vaping Use: Never used   Substance and Sexual Activity    Alcohol use:  Yes     Alcohol/week: 12.0 standard drinks     Types: 12 Cans of beer per week     Comment: 5/23/22at least 6 shots vodka a day   (then says binge drinks and takes 3 days off) KK    Drug use: Never    Sexual activity: Not Currently   Other Topics Concern    None   Social History Narrative    None     Social Determinants of Health     Financial Resource Strain:     Difficulty of Paying Living Expenses: Not on file   Food Insecurity:     Worried About Running Out of Food in the Last Year: Not on file    Brandon of Food in the Last Year: Not on file   Transportation Needs:     Lack of Transportation (Medical): Not on file    Lack of Transportation (Non-Medical): Not on file   Physical Activity:     Days of Exercise per Week: Not on file    Minutes of Exercise per Session: Not on file   Stress:     Feeling of Stress : Not on file   Social Connections:     Frequency of Communication with Friends and Family: Not on file    Frequency of Social Gatherings with Friends and Family: Not on file    Attends Gnosticism Services: Not on file    Active Member of [address] [place] [address] [employer] or Organizations: Not on file    Attends Club or Organization Meetings: Not on file    Marital Status: Not on file   Intimate Partner Violence:     Fear of Current or Ex-Partner: Not on file    Emotionally Abused: Not on file    Physically Abused: Not on file    Sexually Abused: Not on file   Housing Stability:     Unable to Pay for Housing in the Last Year: Not on file    Number of [place] in the Last Year: Not on file    Unstable Housing in the Last Year: Not on file       PHYSICAL EXAM       ED Triage Vitals [06/15/22 0055]   BP Temp Temp Source Heart Rate Resp SpO2 Height Weight   139/76 98.8 °F (37.1 °C) Oral (!) 105 16 97 % -- --       Physical Exam  Vitals and nursing note reviewed. Constitutional:       General: He is not in acute distress. Appearance: He is well-developed. He is not diaphoretic. HENT:      Head: Normocephalic and atraumatic. Eyes:      General:         Right eye: No discharge. Left eye: No discharge.       Pupils: Pupils are equal, round, and reactive to light. Neck:      Thyroid: No thyromegaly. Trachea: No tracheal deviation. Cardiovascular:      Rate and Rhythm: Normal rate and regular rhythm. Heart sounds: No murmur heard. Pulmonary:      Breath sounds: No wheezing or rales. Chest:      Chest wall: No tenderness. Abdominal:      General: There is no distension. Palpations: Abdomen is soft. There is no mass. Tenderness: There is no abdominal tenderness. There is no guarding or rebound. Musculoskeletal:         General: No tenderness or deformity. Normal range of motion. Cervical back: Normal range of motion. Skin:     General: Skin is warm. Coloration: Skin is not pale. Findings: No erythema or rash. Neurological:      Mental Status: He is alert. Cranial Nerves: No cranial nerve deficit. Motor: No abnormal muscle tone. Coordination: Coordination normal.         DIAGNOSTIC RESULTS     RADIOLOGY:   Non-plain film images such as CT, Ultrasoundand MRI are read by the radiologist. Plain radiographic images are visualized and preliminarily interpreted by the emergency physician with the below findings:       Impression   1. Subacute L2 compression fracture with mild interval loss of height since   the prior radiograph. 2. No significant retropulsion. 3. Degenerative changes are otherwise present throughout the lumbar spine as   above. ED BEDSIDE ULTRASOUND:   Performed by ED Physician - none    LABS:  Labs Reviewed - No data to display    All other labs were withinnormal range or not returned as of this dictation. EMERGENCY DEPARTMENT COURSE and DIFFERENTIAL DIAGNOSIS/MDM:     PMH, Surgical Hx, FH, Social Hx reviewed by myself (ETOH usage, Tobacco usage, Drug usage reviewed by myself, no pertinent Hx)- No Pertinent Hx     Old records were reviewed by me     20-year-old with acute alcohol intoxication. Patient presented with alcohol intoxication.   Recent diagnosis of compression fracture. Spine ordered an MRI of patient's back outpatient. Patient presented here intoxicated requesting to get the MRI in the emergency room. He has no cord compression symptoms. Because of this he needs to follow-up outpatient to get his MRI. He is able to ambulate with steady gait. Outpatient follow-up. I estimate there is LOW risk for Sepsis, MI, Stroke, Tamponade, PTX, Toxicity or other life threatening etiology thus I consider the discharge disposition reasonable. The patient is at low risk for mortality based on demographic, history and clinical factors. Given the best available information and clinical assessment, I estimate the risk of hospitalization to be greater than risk of treatment at home. I have explained to the patient that the risk could rapidly change, given precautions for return and instructions. Explained to patient that the risk for mortality is low based on demographic, history and clinical factors. I discussed with patient the results of evaluation in the ED, diagnosis, care, and prognosis. The plan is to discharge to home. Patient is in agreement with plan and questions have been answered. I also discussed with patient the reasons which may require a return visit and the importance of follow-up care. The patient is well-appearing, nontoxic, and improved at the time of discharge. Patient agrees to call to arrange follow-up care as directed. Patient understands to return immediately for worsening/change in symptoms. CRITICAL CARE TIME   Total Critical Caretime was 0 minutes, excluding separately reportable procedures. There was a high probability of clinically significant/life threatening deterioration in the patient's condition which required my urgent intervention. PROCEDURES:  Unlessotherwise noted below, none    FINAL IMPRESSION      1.  Acute alcoholic intoxication without complication [facility])          DISPOSITION/PLAN   DISPOSITION Decision To Discharge 06/15/2022 01:11:08 AM    PATIENT REFERRED TO:  PCP follow up 1-2 days          [place] [address]  [phone]            DISCHARGE MEDICATIONS:  Discharge Medication List as of 6/15/2022  1:16 AM             (Please note that portions ofthis note were completed with a voice recognition program.  Efforts were made to edit the dictations but occasionally words are mis-transcribed.)    Loyd Conte MD(electronically signed)  Attending Emergency Physician        Loyd Conte MD  06/15/22 3853

## 2022-07-01 ENCOUNTER — HOSPITAL ENCOUNTER (OUTPATIENT)
Dept: MRI IMAGING | Age: 52
Discharge: HOME | End: 2022-07-01
Payer: MEDICAID

## 2022-07-01 DIAGNOSIS — S32.020B: ICD-10-CM

## 2022-07-01 PROCEDURE — 72148 MRI LUMBAR SPINE W/O DYE: CPT

## 2022-07-13 ENCOUNTER — APPOINTMENT (OUTPATIENT)
Dept: GENERAL RADIOLOGY | Age: 52
End: 2022-07-13
Payer: MEDICAID

## 2022-07-13 ENCOUNTER — HOSPITAL ENCOUNTER (EMERGENCY)
Age: 52
Discharge: HOME | End: 2022-07-13
Payer: MEDICAID

## 2022-07-13 VITALS
RESPIRATION RATE: 17 BRPM | BODY MASS INDEX: 23.18 KG/M2 | OXYGEN SATURATION: 94 % | TEMPERATURE: 98 F | SYSTOLIC BLOOD PRESSURE: 139 MMHG | HEART RATE: 128 BPM | WEIGHT: 147.71 LBS | HEIGHT: 67 IN | DIASTOLIC BLOOD PRESSURE: 90 MMHG

## 2022-07-13 DIAGNOSIS — J44.1 COPD EXACERBATION (HCC): Primary | ICD-10-CM

## 2022-07-13 LAB
ANION GAP SERPL CALCULATED.3IONS-SCNC: 16 MMOL/L (ref 3–16)
BASOPHILS ABSOLUTE: 0.1 K/UL (ref 0–0.2)
BASOPHILS RELATIVE PERCENT: 1.5 %
BILIRUBIN URINE: NEGATIVE
BLOOD, URINE: NEGATIVE
BUN BLDV-MCNC: 8 MG/DL (ref 7–20)
CALCIUM SERPL-MCNC: 9 MG/DL (ref 8.3–10.6)
CHLORIDE BLD-SCNC: 103 MMOL/L (ref 99–110)
CLARITY: CLEAR
CO2: 21 MMOL/L (ref 21–32)
COLOR: YELLOW
CREAT SERPL-MCNC: <0.5 MG/DL (ref 0.9–1.3)
EOSINOPHILS ABSOLUTE: 0.9 K/UL (ref 0–0.6)
EOSINOPHILS RELATIVE PERCENT: 10.2 %
GFR AFRICAN AMERICAN: >60
GFR NON-AFRICAN AMERICAN: >60
GLUCOSE BLD-MCNC: 109 MG/DL (ref 70–99)
GLUCOSE URINE: NEGATIVE MG/DL
HCT VFR BLD CALC: 43.5 % (ref 40.5–52.5)
HEMOGLOBIN: 14.8 G/DL (ref 13.5–17.5)
KETONES, URINE: NEGATIVE MG/DL
LEUKOCYTE ESTERASE, URINE: NEGATIVE
LIPASE: 39 U/L (ref 13–60)
LYMPHOCYTES ABSOLUTE: 2.8 K/UL (ref 1–5.1)
LYMPHOCYTES RELATIVE PERCENT: 30.9 %
MAGNESIUM: 2.5 MG/DL (ref 1.8–2.4)
MCH RBC QN AUTO: 34.4 PG (ref 26–34)
MCHC RBC AUTO-ENTMCNC: 34 G/DL (ref 31–36)
MCV RBC AUTO: 101.4 FL (ref 80–100)
MICROSCOPIC EXAMINATION: NORMAL
MONOCYTES ABSOLUTE: 0.6 K/UL (ref 0–1.3)
MONOCYTES RELATIVE PERCENT: 7 %
NEUTROPHILS ABSOLUTE: 4.6 K/UL (ref 1.7–7.7)
NEUTROPHILS RELATIVE PERCENT: 50.4 %
NITRITE, URINE: NEGATIVE
PDW BLD-RTO: 14.1 % (ref 12.4–15.4)
PH UA: 6 (ref 5–8)
PLATELET # BLD: 325 K/UL (ref 135–450)
PMV BLD AUTO: 6.9 FL (ref 5–10.5)
POTASSIUM REFLEX MAGNESIUM: 3.1 MMOL/L (ref 3.5–5.1)
PROTEIN UA: NEGATIVE MG/DL
RBC # BLD: 4.29 M/UL (ref 4.2–5.9)
SODIUM BLD-SCNC: 140 MMOL/L (ref 136–145)
SPECIFIC GRAVITY UA: 1 (ref 1–1.03)
TROPONIN: <0.01 NG/ML
URINE REFLEX TO CULTURE: NORMAL
URINE TYPE: NORMAL
UROBILINOGEN, URINE: 0.2 E.U./DL
WBC # BLD: 9.2 K/UL (ref 4–11)

## 2022-07-13 PROCEDURE — 93005 ELECTROCARDIOGRAM TRACING: CPT | Performed by: EMERGENCY MEDICINE

## 2022-07-13 PROCEDURE — 83690 ASSAY OF LIPASE: CPT

## 2022-07-13 PROCEDURE — 36415 COLL VENOUS BLD VENIPUNCTURE: CPT

## 2022-07-13 PROCEDURE — 6370000000 HC RX 637 (ALT 250 FOR IP): Performed by: PHYSICIAN ASSISTANT

## 2022-07-13 PROCEDURE — 80048 BASIC METABOLIC PNL TOTAL CA: CPT

## 2022-07-13 PROCEDURE — 94760 N-INVAS EAR/PLS OXIMETRY 1: CPT

## 2022-07-13 PROCEDURE — 81003 URINALYSIS AUTO W/O SCOPE: CPT

## 2022-07-13 PROCEDURE — 71045 X-RAY EXAM CHEST 1 VIEW: CPT

## 2022-07-13 PROCEDURE — 84484 ASSAY OF TROPONIN QUANT: CPT

## 2022-07-13 PROCEDURE — 99285 EMERGENCY DEPT VISIT HI MDM: CPT

## 2022-07-13 PROCEDURE — 83735 ASSAY OF MAGNESIUM: CPT

## 2022-07-13 PROCEDURE — 85025 COMPLETE CBC W/AUTO DIFF WBC: CPT

## 2022-07-13 PROCEDURE — 94640 AIRWAY INHALATION TREATMENT: CPT

## 2022-07-13 RX ORDER — ALBUTEROL SULFATE 90 UG/1
2 AEROSOL, METERED RESPIRATORY (INHALATION)
Status: COMPLETED | OUTPATIENT
Start: 2022-07-13 | End: 2022-07-13

## 2022-07-13 RX ORDER — ALBUTEROL SULFATE 90 UG/1
2 AEROSOL, METERED RESPIRATORY (INHALATION) EVERY 6 HOURS PRN
Qty: 18 G | Refills: 0 | Status: SHIPPED | OUTPATIENT
Start: 2022-07-13

## 2022-07-13 RX ADMIN — Medication 2 PUFF: at 08:41

## 2022-07-13 ASSESSMENT — PAIN SCALES - GENERAL: PAINLEVEL_OUTOF10: 10

## 2022-07-13 ASSESSMENT — PAIN - FUNCTIONAL ASSESSMENT: PAIN_FUNCTIONAL_ASSESSMENT: 0-10

## 2022-07-13 NOTE — ED PROVIDER NOTES
**ADVANCED PRACTICE PROVIDER, I HAVE EVALUATED THIS PATIENT**        [address] ENCOUNTER      Pt Name: Lauren Valera  EAU:[id number]  [place] 1970  Date of evaluation: 7/13/2022  Provider: Abel Justice PA-C      Chief Complaint:    Chief Complaint   Patient presents with    Shortness of Breath     pt states that it started a couple of weeks ago history of COPD         Nursing Notes, Past Medical Hx, Past Surgical Hx, Social Hx, Allergies, and Family Hx were all reviewed and agreed with or any disagreements were addressed in the HPI.    HPI: (Location, Duration, Timing, Severity, Quality, Assoc Sx, Context, Modifying factors)    Chief Complaint of shortness of breath    This is a  45 y.o. male who presents indicating that he has been feeling a bit short of breath the last couple of weeks or so. He states that he knows albuterol is the best medicine for him. He states that he might have some allergies and the Bactrim but he really is not interested in taking any Flonase or nasal sprays. Also sometimes will bring up a bit of mucus when he coughs but states that he really does not want any Mucinex. He states that he does have a history of following up with a COPD/asthma specialist but has not seen him in a few years. He would like to see the new provider at that practice and would like a referral there. He states he already has an outpatient appointment with her however. In the meantime he states that he really just wants a couple of puffs of albuterol as that really helps him the best.  Patient denying any pain, no vomiting or diarrhea, no extremity acute changes. No rash.     PastMedical/Surgical History:      Diagnosis Date    Asthma     COPD (chronic obstructive pulmonary disease) [facility] [address]     ETOH abuse          Procedure Laterality Date    WRIST SURGERY Right        Medications:  Current Discharge Medication List CONTINUE these medications which have NOT CHANGED    Details   lidocaine 4 % external patch Place 1 patch onto the skin daily  Qty: 30 patch, Refills: 0               Review of Systems:  (2-9 systems needed)  Review of Systems  Positive history as well with no recent fevers or chills, no headache or vision change, no earache or decreased hearing. Positive for some mild nasal congestion and posterior nasal drainage, no sore throat. Positive for feeling tight and wheezy when he does not have his albuterol according to patient. He states that he has not had this in a number of hours today and that is why he came in. No chest pain. No abdominal pain vomiting or diarrhea. No extremity acute weakness or loss of sensation. No rash. \"Positives and Pertinent negatives as per HPI\"    Physical Exam:  Physical Exam  Vitals and nursing note reviewed. Constitutional:       General: He is not in acute distress. Appearance: Normal appearance. He is not ill-appearing, toxic-appearing or diaphoretic. HENT:      Head: Normocephalic and atraumatic. Right Ear: External ear normal.      Left Ear: External ear normal.      Nose: Congestion present. No rhinorrhea. Mouth/Throat:      Mouth: Mucous membranes are moist.      Pharynx: No posterior oropharyngeal erythema. Eyes:      General:         Right eye: No discharge. Left eye: No discharge. Conjunctiva/sclera: Conjunctivae normal.   Cardiovascular:      Rate and Rhythm: Regular rhythm. Tachycardia present. Pulses: Normal pulses. Heart sounds: Normal heart sounds. Pulmonary:      Effort: Pulmonary effort is normal. No respiratory distress. Breath sounds: No stridor. No rhonchi or rales. Comments: Patient indicates that he feels wheezy, however no overt wheezing noted on respirations. No tachypnea or recruitment. No dead spacing or egophony. A few scattered intermittent mucous crackles present.   Chest:      Chest wall: No tenderness. Musculoskeletal:         General: No swelling, tenderness, deformity or signs of injury. Normal range of motion. Cervical back: Normal range of motion and neck supple. Skin:     General: Skin is warm and dry. Capillary Refill: Capillary refill takes less than 2 seconds. Findings: No rash. Neurological:      General: No focal deficit present. Mental Status: He is alert and oriented to person, place, and time. Mental status is at baseline. Motor: No weakness. Gait: Gait normal.   Psychiatric:         Mood and Affect: Mood normal.         Behavior: Behavior normal.         MEDICAL DECISION MAKING    Vitals:    Vitals:    07/13/22 0625 07/13/22 0627   BP: (!) 139/90    Pulse: (!) 120    Resp: 18    Temp:  98 °F (36.7 °C)   TempSrc:  Oral   SpO2: 94%    Weight: 147 lb 11.3 oz (67 kg)    Height: 5' 7\" (1.702 m)        LABS:  Labs Reviewed   BASIC METABOLIC PANEL W/ REFLEX TO MG FOR LOW K - Abnormal; Notable for the following components:       Result Value    Potassium reflex Magnesium 3.1 (*)     Glucose 109 (*)     CREATININE <0.5 (*)     All other components within normal limits   CBC WITH AUTO DIFFERENTIAL - Abnormal; Notable for the following components:    .4 (*)     MCH 34.4 (*)     Eosinophils Absolute 0.9 (*)     All other components within normal limits   MAGNESIUM - Abnormal; Notable for the following components:    Magnesium 2.50 (*)     All other components within normal limits   TROPONIN   LIPASE   URINALYSIS WITH REFLEX TO CULTURE        Remainder of labs reviewed and were negative at this time or not returned at the time of this note.     RADIOLOGY:   Non-plain film images such as CT, Ultrasound and MRI are read by the radiologist. Emmanuel Nicole PA-C have directly visualized the radiologic plain film image(s) with the below findings:      Interpretation per the Radiologist below, if available at the time of this note:    XR CHEST PORTABLE   Final ligamentum flavum hypertrophy. There is a disc bulge and small osteophytes with minimal narrowing of the neural foramina. The thecal sac is not stenotic. L5-S1: There is a disc bulge measuring 3-4 mm. Disc and/or osteophytes result in mild narrowing of the neural foramina bilaterally. The thecal sac is not stenotic. The S1 nerve roots are intact. Acute to subacute compression fracture of the L2 vertebral body with approximately 40% loss of height of the vertebral body. There is no retropulsion of the vertebral body into the spinal canal. Disc and osteophytes cause mild narrowing of the neural foramina at several levels as discussed above. Stenosis of the thecal sac in the lumbar region. XR CHEST PORTABLE    Result Date: 7/13/2022  EXAMINATION: ONE XRAY VIEW OF THE CHEST 7/13/2022 6:44 am COMPARISON: November 6, 2020 HISTORY: ORDERING SYSTEM PROVIDED HISTORY: SOB TECHNOLOGIST PROVIDED HISTORY: Reason for exam:->SOB Reason for Exam: sob FINDINGS: The cardiomediastinal silhouette is within normal range. Lungs are clear. There is no focal pulmonary consolidation, pleural effusion, pneumothorax, or evidence of airspace pulmonary edema. 1. No acute radiographic abnormality in the chest.          MEDICAL DECISION MAKING / ED COURSE:      PROCEDURES:   Procedures    None    Patient was given:  Medications   albuterol sulfate HFA (PROVENTIL;VENTOLIN;PROAIR) 108 (90 Base) MCG/ACT inhaler 2 puff (has no administration in time range)     This patient presents as above and baseline labs and EKG and chest x-ray initially ordered while patient is placed in the [facility]. At this time chest x-ray returns as above. EKG interpreted by ED physician. Please see that note for details. Blood work in the meantime has returned looking pretty good with no elevated lipase or troponin. Patient is not at all complaining of pain or tenderness at this time. No indication or suspicion of acute coronary syndrome.   No elevated white cell count or concerning H&H. Patient is breathing easily, oxygenating well, no respiratory fatigue or indication of acute respiratory compromise. Urinalysis is good. BMP reasonably good as well. Patient is brought back to the ER room for evaluation and further care. EMS indicated that there was some concern about acute intoxication. Patient however appears to be at baseline, able to ambulate easily, back and forth in the emergency department, able to make his own decisions, and no need for emergent treatment or observation or holding for this. He is not driving today. Patient has received a couple of breathing treatments. His heart rate is up however, this is directly after receiving a couple of breathing treatments. This felt to be likely an interaction between that and patient speaking very rapidly, remaining a bit worked up here in the ER. No complaint of nausea or vomiting or any abdominal pain on exam or suspicion of significant dehydration. He is indicating that he would like to be discharged to follow-up outpatient with his COPD specialist.  This is appropriate. At this time no suspicion of acute system compromise and patient will be discharged in accordance with the following instructions he verbalizes understanding and agreement with. Use medications as written, avoid smoking and tobacco smoke exposure, and follow-up outpatient with your asthma and COPD doctor for further care and treatment. Return to the emergency department for any emergency worsening or concern. The patient tolerated their visit well. I evaluated the patient. The physician was available for consultation as needed. The patient and / or the family were informed of the results of any tests, a time was given to answer questions, a plan was proposed and they agreed with plan. CLINICAL IMPRESSION:  1.  COPD exacerbation ([facility] [address]        DISPOSITION Decision To Discharge 07/13/2022 08:32:23 AM      PATIENT REFERRED TO:   Dr. Curt Lofton  [address] [facility]    (N) [phone]    (N) [phone]    Monday 8 AM - 5 PM    Wednesday 8 AM - 5 PM          DISCHARGE MEDICATIONS:  Current Discharge Medication List          DISCONTINUED MEDICATIONS:  Current Discharge Medication List                 (Please note the MDM and HPI sections of this note were completed with a voice recognition program.  Efforts were made to edit the dictations but occasionally words are mis-transcribed.)    Electronically signed, Beverley Gee PA-C,           Beverley Gee PA-C  07/13/22 0733

## 2022-07-14 LAB
EKG ATRIAL RATE: 116 BPM
EKG DIAGNOSIS: NORMAL
EKG P AXIS: 61 DEGREES
EKG P-R INTERVAL: 150 MS
EKG Q-T INTERVAL: 316 MS
EKG QRS DURATION: 82 MS
EKG QTC CALCULATION (BAZETT): 439 MS
EKG R AXIS: 68 DEGREES
EKG T AXIS: 6 DEGREES
EKG VENTRICULAR RATE: 116 BPM

## 2022-07-14 PROCEDURE — 93010 ELECTROCARDIOGRAM REPORT: CPT | Performed by: INTERNAL MEDICINE

## 2022-10-27 ENCOUNTER — HOSPITAL ENCOUNTER (OUTPATIENT)
Dept: GENERAL RADIOLOGY | Age: 52
Discharge: HOME | End: 2022-10-27
Payer: MEDICAID

## 2022-10-27 DIAGNOSIS — S32.020B: ICD-10-CM

## 2022-10-27 PROCEDURE — 77080 DXA BONE DENSITY AXIAL: CPT

## 2022-12-02 ENCOUNTER — HOSPITAL ENCOUNTER (EMERGENCY)
Age: 52
Discharge: HOME | End: 2022-12-02
Attending: EMERGENCY MEDICINE
Payer: MEDICARE

## 2022-12-02 ENCOUNTER — APPOINTMENT (OUTPATIENT)
Dept: GENERAL RADIOLOGY | Age: 52
End: 2022-12-02
Payer: MEDICARE

## 2022-12-02 VITALS
HEIGHT: 67 IN | HEART RATE: 109 BPM | BODY MASS INDEX: 25.54 KG/M2 | SYSTOLIC BLOOD PRESSURE: 139 MMHG | RESPIRATION RATE: 20 BRPM | DIASTOLIC BLOOD PRESSURE: 94 MMHG | WEIGHT: 162.7 LBS | TEMPERATURE: 98.2 F | OXYGEN SATURATION: 94 %

## 2022-12-02 DIAGNOSIS — R06.02 SHORTNESS OF BREATH: ICD-10-CM

## 2022-12-02 DIAGNOSIS — J44.1 COPD EXACERBATION (HCC): Primary | ICD-10-CM

## 2022-12-02 DIAGNOSIS — F10.920 ACUTE ALCOHOLIC INTOXICATION WITHOUT COMPLICATION (HCC): ICD-10-CM

## 2022-12-02 LAB
A/G RATIO: 1.8 (ref 1.1–2.2)
ALBUMIN SERPL-MCNC: 3.6 G/DL (ref 3.4–5)
ALP BLD-CCNC: 38 U/L (ref 40–129)
ALT SERPL-CCNC: 16 U/L (ref 10–40)
ANION GAP SERPL CALCULATED.3IONS-SCNC: 13 MMOL/L (ref 3–16)
AST SERPL-CCNC: 21 U/L (ref 15–37)
BASOPHILS ABSOLUTE: 0.1 K/UL (ref 0–0.2)
BASOPHILS RELATIVE PERCENT: 1.2 %
BILIRUB SERPL-MCNC: <0.2 MG/DL (ref 0–1)
BUN BLDV-MCNC: 8 MG/DL (ref 7–20)
CALCIUM SERPL-MCNC: 8.5 MG/DL (ref 8.3–10.6)
CHLORIDE BLD-SCNC: 104 MMOL/L (ref 99–110)
CO2: 22 MMOL/L (ref 21–32)
CREAT SERPL-MCNC: 0.5 MG/DL (ref 0.9–1.3)
EOSINOPHILS ABSOLUTE: 0.3 K/UL (ref 0–0.6)
EOSINOPHILS RELATIVE PERCENT: 3.6 %
ETHANOL: 298 MG/DL (ref 0–0.08)
GFR SERPL CREATININE-BSD FRML MDRD: >60 ML/MIN/{1.73_M2}
GLUCOSE BLD-MCNC: 193 MG/DL (ref 70–99)
HCT VFR BLD CALC: 43.6 % (ref 40.5–52.5)
HEMOGLOBIN: 14.5 G/DL (ref 13.5–17.5)
LYMPHOCYTES ABSOLUTE: 2.3 K/UL (ref 1–5.1)
LYMPHOCYTES RELATIVE PERCENT: 31 %
MCH RBC QN AUTO: 34.7 PG (ref 26–34)
MCHC RBC AUTO-ENTMCNC: 33.3 G/DL (ref 31–36)
MCV RBC AUTO: 104.4 FL (ref 80–100)
MONOCYTES ABSOLUTE: 0.4 K/UL (ref 0–1.3)
MONOCYTES RELATIVE PERCENT: 5.3 %
NEUTROPHILS ABSOLUTE: 4.3 K/UL (ref 1.7–7.7)
NEUTROPHILS RELATIVE PERCENT: 58.9 %
PDW BLD-RTO: 13.6 % (ref 12.4–15.4)
PLATELET # BLD: 237 K/UL (ref 135–450)
PMV BLD AUTO: 8.3 FL (ref 5–10.5)
POTASSIUM REFLEX MAGNESIUM: 3.8 MMOL/L (ref 3.5–5.1)
PRO-BNP: 29 PG/ML (ref 0–124)
RAPID INFLUENZA  B AGN: NEGATIVE
RAPID INFLUENZA A AGN: NEGATIVE
RBC # BLD: 4.17 M/UL (ref 4.2–5.9)
SARS-COV-2, NAAT: NOT DETECTED
SODIUM BLD-SCNC: 139 MMOL/L (ref 136–145)
TOTAL PROTEIN: 5.6 G/DL (ref 6.4–8.2)
TROPONIN: <0.01 NG/ML
WBC # BLD: 7.3 K/UL (ref 4–11)

## 2022-12-02 PROCEDURE — 80053 COMPREHEN METABOLIC PANEL: CPT

## 2022-12-02 PROCEDURE — 87635 SARS-COV-2 COVID-19 AMP PRB: CPT

## 2022-12-02 PROCEDURE — 84484 ASSAY OF TROPONIN QUANT: CPT

## 2022-12-02 PROCEDURE — 83880 ASSAY OF NATRIURETIC PEPTIDE: CPT

## 2022-12-02 PROCEDURE — 87804 INFLUENZA ASSAY W/OPTIC: CPT

## 2022-12-02 PROCEDURE — 99285 EMERGENCY DEPT VISIT HI MDM: CPT

## 2022-12-02 PROCEDURE — 6370000000 HC RX 637 (ALT 250 FOR IP)

## 2022-12-02 PROCEDURE — 96374 THER/PROPH/DIAG INJ IV PUSH: CPT

## 2022-12-02 PROCEDURE — 94760 N-INVAS EAR/PLS OXIMETRY 1: CPT

## 2022-12-02 PROCEDURE — 85025 COMPLETE CBC W/AUTO DIFF WBC: CPT

## 2022-12-02 PROCEDURE — 71045 X-RAY EXAM CHEST 1 VIEW: CPT

## 2022-12-02 PROCEDURE — 93005 ELECTROCARDIOGRAM TRACING: CPT | Performed by: EMERGENCY MEDICINE

## 2022-12-02 PROCEDURE — 82077 ASSAY SPEC XCP UR&BREATH IA: CPT

## 2022-12-02 PROCEDURE — 6360000002 HC RX W HCPCS: Performed by: EMERGENCY MEDICINE

## 2022-12-02 RX ORDER — DEXAMETHASONE SODIUM PHOSPHATE 4 MG/ML
8 INJECTION, SOLUTION INTRA-ARTICULAR; INTRALESIONAL; INTRAMUSCULAR; INTRAVENOUS; SOFT TISSUE ONCE
Status: COMPLETED | OUTPATIENT
Start: 2022-12-02 | End: 2022-12-02

## 2022-12-02 RX ORDER — METHYLPREDNISOLONE 4 MG/1
4 TABLET ORAL SEE ADMIN INSTRUCTIONS
Qty: 1 KIT | Refills: 0 | Status: CANCELLED | OUTPATIENT
Start: 2022-12-02 | End: 2022-12-08

## 2022-12-02 RX ORDER — IPRATROPIUM BROMIDE AND ALBUTEROL SULFATE 2.5; .5 MG/3ML; MG/3ML
1 SOLUTION RESPIRATORY (INHALATION) ONCE
Status: DISCONTINUED | OUTPATIENT
Start: 2022-12-02 | End: 2022-12-02 | Stop reason: HOSPADM

## 2022-12-02 RX ORDER — METHYLPREDNISOLONE 4 MG/1
4 TABLET ORAL SEE ADMIN INSTRUCTIONS
Qty: 1 KIT | Refills: 0 | Status: SHIPPED | OUTPATIENT
Start: 2022-12-02 | End: 2022-12-08

## 2022-12-02 RX ORDER — IPRATROPIUM BROMIDE AND ALBUTEROL SULFATE 2.5; .5 MG/3ML; MG/3ML
SOLUTION RESPIRATORY (INHALATION)
Status: DISCONTINUED
Start: 2022-12-02 | End: 2022-12-02 | Stop reason: HOSPADM

## 2022-12-02 RX ADMIN — DEXAMETHASONE SODIUM PHOSPHATE 8 MG: 4 INJECTION, SOLUTION INTRAMUSCULAR; INTRAVENOUS at 17:43

## 2022-12-02 ASSESSMENT — PAIN - FUNCTIONAL ASSESSMENT: PAIN_FUNCTIONAL_ASSESSMENT: NONE - DENIES PAIN

## 2022-12-02 NOTE — Clinical Note
The patient has decided to leave against medical advice, because he wants to leave. He has normal mental status and adequate capacity to make medical decisions. The patient refuses hospital admission and wants to be discharged. The risks hav e been explained to the patient, including worsening illness, chronic pain, permanent disability, and death. The benefits of admission have also been explained, including the availability and proximity of nurses, physicians, monitoring, diagnostic  testing, and treatment. The patient was able to understand and state the risks and benefits of hospital admission. This was witnessed by nurse Jean Mai and me. He had the opportunity to ask questions about his medical condition. The patien t was treated to the extent that he would allow, and knows that he may return for care at any time. Follow up has been discussed and arranged with Dr. Juan Sanchez .

## 2022-12-02 NOTE — LETTER
[facility]  [address] [address]  Phone: [phone]    Patient: Maximino Dyson  YOB: 1970  Date: 12/2/2022 Time: 7:35 PM    Leaving the [address] [place] [address] Advice    Chart #:[id number]    This will certify that I, the undersigned,    ______________________________________________________________________    A patient in the above named medical center, having requested discharge and removal from the medical center against the advice of my attending physician(s), hereby release the Emergency Department, its physicians, officers and employees, severally and individually, from any and all liability of any nature whatsoever for any injury or harm or complication of any kind that may result directly or indirectly, by reason of my terminating my stay as a patient from [facility], and hereby waive any and all rights of action I may now have or later acquire as a result of my voluntary departure from [facility] and the termination of my stay as a patient therein. This release is made with the full knowledge of the danger that may result from the action which I am taking.       Date:_______________________                         ___________________________                                                                                    Patient/Legal Representative    Witness:        ____________________________                          ___________________________  Nurse                                                                        Physician

## 2022-12-02 NOTE — ED PROVIDER NOTES
[address] [facility]      Pt Name: Jeimy Mooney  MRN: [id number]  [place] 1970  Date of evaluation: 12/2/2022  Provider: Rosalba Santoro, [facility],[address]Th [address]  Chief Complaint   Patient presents with    Shortness of Breath     With chest pressure. This patient is at risk for communicable infection. Therefore, personal protection equipment consisting of a mask and gloves was worn for the exam.    HPI  Jeimy Mooney is a 45 y.o. male who presents with shortness of breath. States is an exacerbation of COPD. He is also having the chest pressure. Started today. He is drinks alcohol today and every day. He usually uses Spiriva. He has been dizzy. He denies any fevers or chills. He smokes tobacco.  Nothing makes it better or worse. Describes it as moderate to severe. He denies any previous history of heart problems. He denies any radiation of his pain. Exertion makes it worse and rest makes it better. ? REVIEW OF SYSTEMS  All systems negative except as noted in the HPI. Reviewed Nurses' notes and concur. History limited due to slow to answer and poor historian. No LMP for male patient. PAST MEDICAL HISTORY  Past Medical History:   Diagnosis Date    Asthma     COPD (chronic obstructive pulmonary disease) ([facility] [address]     ETOH abuse        FAMILY HISTORY  No family history on file. SOCIAL HISTORY   reports that he has been smoking cigarettes. He has been smoking an average of .25 packs per day. He has never used smokeless tobacco. He reports current alcohol use of about 12.0 standard drinks per week. He reports that he does not use drugs.     SURGICAL HISTORY  Past Surgical History:   Procedure Laterality Date    WRIST SURGERY Right        CURRENT MEDICATIONS  Current Outpatient Rx   Medication Sig Dispense Refill    methylPREDNISolone (MEDROL, DANYELL,) 4 MG tablet Take 1 tablet by mouth See Admin Instructions for 6 days By mouth as directed on the package. 1 kit 0    albuterol (PROVENTIL) (5 MG/ML) 0.5% nebulizer solution Take 0.5 mLs by nebulization every 6 hours as needed for Wheezing 1 each 0    albuterol sulfate HFA (PROVENTIL;VENTOLIN;PROAIR) 108 (90 Base) MCG/ACT inhaler Inhale 2 puffs into the lungs every 6 hours as needed for Wheezing 18 g 0       ALLERGIES  Allergies   Allergen Reactions    Codeine Itching    Pcn [Penicillins]          PHYSICAL EXAM  VITAL SIGNS: BP (!) 139/94   Pulse (!) 109   Temp 98.2 °F (36.8 °C) (Oral)   Resp 20   Ht 5' 7\" (1.702 m)   Wt 162 lb 11.2 oz (73.8 kg)   SpO2 94%   BMI 25.48 kg/m²   Constitutional: Well-developed, well-nourished, appears normal, nontoxic, activity: Resting comfortably on the cart, in no obvious pain, speaking full senses, does not appear ill or toxic, smells of alcohol. HENT: Normocephalic, Atraumatic, Bilateral external ears normal, Mucus membranes are moist and oropharynx is patent and clear, No oral exudates, Nose normal.  Eyes:  Conjunctiva normal, No discharge. No scleral icterus. Neck: Normal range of motion, No tenderness, Supple. Lymphatic: No lymphadenopathy noted. Cardiovascular: Tachycardic heart rate, Normal rhythm, no murmurs, no gallops, no rubs. Thorax & Lungs: Normal breath sounds, no respiratory distress, no wheezing, no rales, no rhonchi  Abdomen: Soft, Nontender, No hepatosplenomegaly, No masses, No pulsatile masses, No distension, normal bowel sounds  Skin: Warm, Dry, No erythema, No rash. Extremities: No edema, No tenderness, No cyanosis, No clubbing. No amputations, capillary refill less than 2 seconds. Musculoskeletal: Good range of motion in all major joints, No tenderness to palpation, no major deformities noted.   Neurologic: Alert & oriented x 3  Psychiatric: Affect normal, Mood normal.    ?  LABORATORY  Labs Reviewed   CBC WITH AUTO DIFFERENTIAL - Abnormal; Notable for the following components:       Result Value    RBC 4.17 (*)     .4 (*)     MCH 34.7 (*)     All other components within normal limits   COMPREHENSIVE METABOLIC PANEL W/ REFLEX TO MG FOR LOW K - Abnormal; Notable for the following components:    Glucose 193 (*)     Creatinine 0.5 (*)     Total Protein 5.6 (*)     Alkaline Phosphatase 38 (*)     All other components within normal limits   COVID-19, RAPID   RAPID INFLUENZA A/B ANTIGENS   TROPONIN   BRAIN NATRIURETIC PEPTIDE   ETHANOL   URINE DRUG SCREEN   URINALYSIS WITH REFLEX TO CULTURE     EKG Interpretation    Interpreted by emergency department physician  Time performed: 4868  Time read: 1741    Rhythm: Sinus tachycardia  Ventricular Rate: 125  QRS Axis: 63  Ectopy: None  Conduction: Sinus tachycardia with LVH by voltage with early repolarization abnormalities  ST Segments: Consistent with early repolarization abnormalities  T Waves: Consistent with early repolarization abnormalities  Q Waves: None noted    Other findings: None    Compared to EKG on: 7/13/2022 and appears unchanged including sinus tachycardia    Clinical Impression: Sinus tachycardia with LVH by voltage with repolarization abnormalities. This is compared to an EKG on 7/13/2022 and appears unchanged including sinus tachycardia. Bib 149, DO    RADIOLOGY/PROCEDURES  I personally reviewed the images for this case. XR CHEST PORTABLE   Final Result   Unremarkable portable chest radiograph. COURSE & MEDICAL DECISION MAKING  Pertinent Labs & Imaging studies reviewed.  (See chart for details)    Vitals:    12/02/22 1726 12/02/22 1829   BP: (!) 139/94    Pulse: (!) 120 (!) 109   Resp:  20   Temp: 98.2 °F (36.8 °C)    TempSrc: Oral    SpO2: 98% 94%   Weight: 162 lb 11.2 oz (73.8 kg)    Height: 5' 7\" (1.702 m)        Medications   dexamethasone (DECADRON) injection 8 mg (8 mg IntraVENous Given 12/2/22 1743)       Discharge Medication List as of 12/2/2022  7:51 PM        START taking these medications    Details methylPREDNISolone (MEDROL, DANYELL,) 4 MG tablet Take 1 tablet by mouth See Admin Instructions for 6 days By mouth as directed on the package. , Disp-1 kit, R-0Normal             SEP-1 CORE MEASURE DATA  Exclusion criteria: the patient is NOT to be included for sepsis due to: Infection is not suspected    Patient remained stable in the ED. however, he decided he did not want to stay for any further testing or for the results of his test.  Therefore, he was signed out [address] He then decided he wanted to stay because he cannot get a ride home if he left [address] Therefore, the remainder of his laboratory work was returned and did not show any need for admission at this time. His alcohol level was 298. However, he was ambulating throughout the emergency part without staggering or ataxia return if any problems. and did not have any slurred speech. Therefore, he was discharged to follow-up with his doctor in 3 to 5 days. Patient was speaking full sentences at the time of his discharge. The patient's blood pressure was found to be elevated according to CMS/Medicare and the Affordable Care Act/ObamaCare criteria. Elevated blood pressure could occur because of pain or anxiety or other reasons and does not mean that they need to have their blood pressure treated or medications otherwise adjusted. However, this could also be a sign that they will need to have their blood pressure treated or medications changed. The patient was instructed to follow up closely with their personal physician to have their blood pressure rechecked. The patient was instructed to take a list of recent blood pressure readings to their next visit with their personal physician. See discharge instructions for specific medications, discharge information, and treatments. They were verbally instructed to return to emergency if any problems.     (This chart has been completed using [employer]. Although attempts have been made to ensure accuracy, words and/or phrases may not be transcribed as intended.)    Patient refused pain medicines at the time of their exam.    Tetanus vaccination status reviewed: tetanus re-vaccination not indicated. IMPRESSION(S):  1. COPD exacerbation ([facility] [address]    2. Shortness of breath    3. Acute alcoholic intoxication without complication ([facility] [address]      ?   Recheck Times: 1950    Diagnostic considerations include but are not limited to:  Acute Coronary Syndrome, Congestive Heart Failure, Myocardial Infarction, Pulmonary Embolus, Pneumonia, Pneumothorax, sepsis, DKA, airway obstruction, bronchitis, burn injury, other       Jenners Olp, DO  12/03/22 0002

## 2022-12-03 ENCOUNTER — HOSPITAL ENCOUNTER (EMERGENCY)
Age: 52
Discharge: HOME | End: 2022-12-03
Attending: EMERGENCY MEDICINE
Payer: MEDICARE

## 2022-12-03 VITALS
DIASTOLIC BLOOD PRESSURE: 97 MMHG | TEMPERATURE: 97.7 F | OXYGEN SATURATION: 100 % | WEIGHT: 158.07 LBS | HEART RATE: 112 BPM | SYSTOLIC BLOOD PRESSURE: 142 MMHG | RESPIRATION RATE: 18 BRPM | BODY MASS INDEX: 24.76 KG/M2

## 2022-12-03 DIAGNOSIS — R51.9 NONINTRACTABLE HEADACHE, UNSPECIFIED CHRONICITY PATTERN, UNSPECIFIED HEADACHE TYPE: Primary | ICD-10-CM

## 2022-12-03 LAB
EKG ATRIAL RATE: 125 BPM
EKG DIAGNOSIS: NORMAL
EKG P-R INTERVAL: 128 MS
EKG Q-T INTERVAL: 404 MS
EKG QRS DURATION: 76 MS
EKG QTC CALCULATION (BAZETT): 583 MS
EKG R AXIS: 63 DEGREES
EKG T AXIS: 70 DEGREES
EKG VENTRICULAR RATE: 125 BPM

## 2022-12-03 PROCEDURE — 99283 EMERGENCY DEPT VISIT LOW MDM: CPT

## 2022-12-03 ASSESSMENT — PAIN - FUNCTIONAL ASSESSMENT: PAIN_FUNCTIONAL_ASSESSMENT: NONE - DENIES PAIN

## 2022-12-03 NOTE — ED NOTES
Pt started screaming \"EVERYBODY STOP TALKING\" Pt informed that he is discharged and that he can leave. Pt told RN to \"shut up\". Pt walked out by security and this RN. Pt then stated \"I need a ride\", pt given bus pass by security. Gait steady.              Young Casey RN  12/03/22 8866

## 2022-12-03 NOTE — ED TRIAGE NOTES
Pt brought to ED by EMS complaining of a headache for the last couple of days. When asked to rate the pain he states \"I don't know. \" Pt informed EMS that he had to finish his beer before they could bring him to the ED. Pt appears to be drunk and when asked how much he has had to drink today he says \"I don't know, I had some alcohol. \" Pt has not taken any over the counter medications for the headache.

## 2022-12-03 NOTE — ED PROVIDER NOTES
Triage Chief Complaint:   Headache (Pt came in by EMS complaining of a headache and told EMS he had to finish his beer before he could leave for the hospital./)      [place]:  Payton Lowe is a 45 y.o. male that presents emergency department with a headache. Patient states that he has had this headache for \"a long time. \"  He will not tell me how long this has been going on. He is not able to describe it or tell me how much pain he is actually in. He denies blurry vision, nausea, vomiting. He states that he has had alcohol today. He denies chest pain, shortness of breath, nausea, vomiting, blurry vision, neck pain. He has not taken anything for his pain    Past Medical History:   Diagnosis Date    Asthma     COPD (chronic obstructive pulmonary disease) ([facility] [address]     ETOH abuse      Past Surgical History:   Procedure Laterality Date    WRIST SURGERY Right      History reviewed. No pertinent family history. Social History     Socioeconomic History    Marital status: Single     Spouse name: Not on file    Number of children: Not on file    Years of education: Not on file    Highest education level: Not on file   Occupational History    Not on file   Tobacco Use    Smoking status: Every Day     Packs/day: 0.25     Types: Cigarettes    Smokeless tobacco: Never   Vaping Use    Vaping Use: Never used   Substance and Sexual Activity    Alcohol use:  Yes     Alcohol/week: 12.0 standard drinks     Types: 12 Cans of beer per week     Comment: 5/23/22at least 6 shots vodka a day   (then says binge drinks and takes 3 days off) KK    Drug use: Never    Sexual activity: Not Currently   Other Topics Concern    Not on file   Social History Narrative    Not on file     Social Determinants of Health     Financial Resource Strain: Not on file   Food Insecurity: Not on file   Transportation Needs: Not on file   Physical Activity: Not on file   Stress: Not on file   Social Connections: Not on file   Intimate Partner Violence: Not on file   Housing Stability: Not on file     No current facility-administered medications for this encounter. Current Outpatient Medications   Medication Sig Dispense Refill    methylPREDNISolone (MEDROL, DANYELL,) 4 MG tablet Take 1 tablet by mouth See Admin Instructions for 6 days By mouth as directed on the package. 1 kit 0    albuterol (PROVENTIL) (5 MG/ML) 0.5% nebulizer solution Take 0.5 mLs by nebulization every 6 hours as needed for Wheezing 1 each 0    albuterol sulfate HFA (PROVENTIL;VENTOLIN;PROAIR) 108 (90 Base) MCG/ACT inhaler Inhale 2 puffs into the lungs every 6 hours as needed for Wheezing 18 g 0     Allergies   Allergen Reactions    Codeine Itching    Pcn [Penicillins]      Nursing Notes Reviewed    ROS:  At least 10 systems reviewed and otherwise negative except as in the [address] Physical Exam:  ED Triage Vitals [12/03/22 1428]   Enc Vitals Group      BP (!) 142/97      Heart Rate (!) 112      Resp 18      Temp 97.7 °F (36.5 °C)      Temp Source Oral      SpO2 100 %      Weight 158 lb 1.1 oz (71.7 kg)      Height       Head Circumference       Peak Flow       Pain Score       Pain Loc       Pain Edu? Excl. in [address]? My pulse oximetry interpretation is which is within the normal range    GENERAL APPEARANCE: Awake and alert. Cooperative. No acute distress. HEAD:  Atraumatic. EYES: EOM's grossly intact. ENT: Mucous membranes are moist.  No trismus. NECK:  Trachea midline. HEART: Tachycardia  LUNGS: Respirations unlabored. CTAB  ABDOMEN: Soft. Non-tender. No guarding or rebound. EXTREMITIES: No acute deformities. SKIN: Warm and dry. NEUROLOGICAL: Moves all 4 extremities spontaneously. PSYCHIATRIC: Normal mood. I have reviewed and interpreted all of the currently available lab results from this visit (if applicable):  No results found for this visit on 12/03/22.        EKG: (All EKG's are interpreted by myself in the absence of a cardiologist)      MDM:  When I was trying to assess the patient he became agitated and angry that I was even asking questions. When I tried to assess how long the symptoms have been going on he states \"a while. \"  He cannot tell me how long that is. I offered him Tylenol for his headache but he states \"I do not want that. \"  He then started yelling that \"you guys are being too loud. \"  I asked him if he wanted to be here and he said no. He then got up and left. Clinical Impression:  1.  Nonintractable headache, unspecified chronicity pattern, unspecified headache type        Disposition Vitals:  [unfilled], [unfilled], [unfilled], [unfilled]    Disposition referral (if applicable):  [facility]) Pre-Services  [phone]        Disposition medications (if applicable):  Discharge Medication List as of 12/3/2022  2:54 PM            (Please note that portions of this note may have been completed with a voice recognition program. Efforts were made to edit the dictations but occasionally words are mis-transcribed.)    MD Marly Hickey MD  12/03/22 3217 stable, well

## 2023-05-06 ENCOUNTER — APPOINTMENT (OUTPATIENT)
Dept: GENERAL RADIOLOGY | Age: 53
End: 2023-05-06
Payer: MEDICAID

## 2023-05-06 ENCOUNTER — HOSPITAL ENCOUNTER (EMERGENCY)
Age: 53
Discharge: HOME | End: 2023-05-07
Attending: EMERGENCY MEDICINE
Payer: MEDICAID

## 2023-05-06 ENCOUNTER — APPOINTMENT (OUTPATIENT)
Dept: CT IMAGING | Age: 53
End: 2023-05-06
Payer: MEDICAID

## 2023-05-06 DIAGNOSIS — K08.89 PAIN, DENTAL: ICD-10-CM

## 2023-05-06 DIAGNOSIS — H53.9 VISUAL DISTURBANCE: Primary | ICD-10-CM

## 2023-05-06 LAB
BASOPHILS # BLD: 0.1 K/UL (ref 0–0.2)
BASOPHILS NFR BLD: 0.7 %
DEPRECATED RDW RBC AUTO: 13.3 % (ref 12.4–15.4)
EOSINOPHIL # BLD: 0.3 K/UL (ref 0–0.6)
EOSINOPHIL NFR BLD: 2.8 %
GLUCOSE BLD-MCNC: 96 MG/DL (ref 70–99)
HCT VFR BLD AUTO: 45.7 % (ref 40.5–52.5)
HGB BLD-MCNC: 15.7 G/DL (ref 13.5–17.5)
INR PPP: 0.87 (ref 0.84–1.16)
LYMPHOCYTES # BLD: 2 K/UL (ref 1–5.1)
LYMPHOCYTES NFR BLD: 21.1 %
MCH RBC QN AUTO: 34.8 PG (ref 26–34)
MCHC RBC AUTO-ENTMCNC: 34.2 G/DL (ref 31–36)
MCV RBC AUTO: 101.7 FL (ref 80–100)
MONOCYTES # BLD: 1.2 K/UL (ref 0–1.3)
MONOCYTES NFR BLD: 12.6 %
NEUTROPHILS # BLD: 6 K/UL (ref 1.7–7.7)
NEUTROPHILS NFR BLD: 62.8 %
PERFORMED ON: NORMAL
PLATELET # BLD AUTO: 235 K/UL (ref 135–450)
PMV BLD AUTO: 7.8 FL (ref 5–10.5)
PROTHROMBIN TIME: 11.8 SEC (ref 11.5–14.8)
RBC # BLD AUTO: 4.5 M/UL (ref 4.2–5.9)
TROPONIN, HIGH SENSITIVITY: 8 NG/L (ref 0–22)
WBC # BLD AUTO: 9.6 K/UL (ref 4–11)

## 2023-05-06 PROCEDURE — 93005 ELECTROCARDIOGRAM TRACING: CPT | Performed by: EMERGENCY MEDICINE

## 2023-05-06 PROCEDURE — 84484 ASSAY OF TROPONIN QUANT: CPT

## 2023-05-06 PROCEDURE — 80053 COMPREHEN METABOLIC PANEL: CPT

## 2023-05-06 PROCEDURE — 70498 CT ANGIOGRAPHY NECK: CPT

## 2023-05-06 PROCEDURE — 83735 ASSAY OF MAGNESIUM: CPT

## 2023-05-06 PROCEDURE — 99285 EMERGENCY DEPT VISIT HI MDM: CPT

## 2023-05-06 PROCEDURE — 71045 X-RAY EXAM CHEST 1 VIEW: CPT

## 2023-05-06 PROCEDURE — 85610 PROTHROMBIN TIME: CPT

## 2023-05-06 PROCEDURE — 6370000000 HC RX 637 (ALT 250 FOR IP): Performed by: EMERGENCY MEDICINE

## 2023-05-06 PROCEDURE — 36415 COLL VENOUS BLD VENIPUNCTURE: CPT

## 2023-05-06 PROCEDURE — 85025 COMPLETE CBC W/AUTO DIFF WBC: CPT

## 2023-05-06 PROCEDURE — 70450 CT HEAD/BRAIN W/O DYE: CPT

## 2023-05-06 RX ORDER — HYDROCODONE BITARTRATE AND ACETAMINOPHEN 5; 325 MG/1; MG/1
1 TABLET ORAL ONCE
Status: COMPLETED | OUTPATIENT
Start: 2023-05-06 | End: 2023-05-06

## 2023-05-06 RX ADMIN — HYDROCODONE BITARTRATE AND ACETAMINOPHEN 1 TABLET: 5; 325 TABLET ORAL at 23:44

## 2023-05-06 ASSESSMENT — PAIN SCALES - GENERAL
PAINLEVEL_OUTOF10: 8
PAINLEVEL_OUTOF10: 6

## 2023-05-06 ASSESSMENT — PAIN - FUNCTIONAL ASSESSMENT
PAIN_FUNCTIONAL_ASSESSMENT: ACTIVITIES ARE NOT PREVENTED
PAIN_FUNCTIONAL_ASSESSMENT: 0-10

## 2023-05-06 ASSESSMENT — PAIN DESCRIPTION - ORIENTATION: ORIENTATION: LEFT;LOWER

## 2023-05-06 ASSESSMENT — PAIN DESCRIPTION - LOCATION: LOCATION: TEETH;FACE

## 2023-05-06 ASSESSMENT — PAIN DESCRIPTION - ONSET: ONSET: ON-GOING

## 2023-05-06 ASSESSMENT — PAIN DESCRIPTION - PAIN TYPE: TYPE: CHRONIC PAIN

## 2023-05-07 VITALS
RESPIRATION RATE: 14 BRPM | WEIGHT: 164.24 LBS | BODY MASS INDEX: 25.78 KG/M2 | OXYGEN SATURATION: 93 % | TEMPERATURE: 98.2 F | HEIGHT: 67 IN | HEART RATE: 82 BPM | DIASTOLIC BLOOD PRESSURE: 69 MMHG | SYSTOLIC BLOOD PRESSURE: 102 MMHG

## 2023-05-07 LAB
ALBUMIN SERPL-MCNC: 3.7 G/DL (ref 3.4–5)
ALBUMIN/GLOB SERPL: 1.6 {RATIO} (ref 1.1–2.2)
ALP SERPL-CCNC: 41 U/L (ref 40–129)
ALT SERPL-CCNC: 13 U/L (ref 10–40)
ANION GAP SERPL CALCULATED.3IONS-SCNC: 15 MMOL/L (ref 3–16)
AST SERPL-CCNC: 19 U/L (ref 15–37)
BILIRUB SERPL-MCNC: <0.2 MG/DL (ref 0–1)
BUN SERPL-MCNC: 5 MG/DL (ref 7–20)
CALCIUM SERPL-MCNC: 8.6 MG/DL (ref 8.3–10.6)
CHLORIDE SERPL-SCNC: 105 MMOL/L (ref 99–110)
CO2 SERPL-SCNC: 21 MMOL/L (ref 21–32)
CREAT SERPL-MCNC: <0.5 MG/DL (ref 0.9–1.3)
GFR SERPLBLD CREATININE-BSD FMLA CKD-EPI: >60 ML/MIN/{1.73_M2}
GLUCOSE SERPL-MCNC: 104 MG/DL (ref 70–99)
MAGNESIUM SERPL-MCNC: 2.1 MG/DL (ref 1.8–2.4)
POTASSIUM SERPL-SCNC: 3.5 MMOL/L (ref 3.5–5.1)
PROT SERPL-MCNC: 6 G/DL (ref 6.4–8.2)
SODIUM SERPL-SCNC: 141 MMOL/L (ref 136–145)

## 2023-05-07 PROCEDURE — 6360000004 HC RX CONTRAST MEDICATION: Performed by: EMERGENCY MEDICINE

## 2023-05-07 PROCEDURE — 6370000000 HC RX 637 (ALT 250 FOR IP): Performed by: EMERGENCY MEDICINE

## 2023-05-07 RX ORDER — CLINDAMYCIN HYDROCHLORIDE 300 MG/1
300 CAPSULE ORAL 3 TIMES DAILY
Qty: 30 CAPSULE | Refills: 0 | Status: SHIPPED | OUTPATIENT
Start: 2023-05-07 | End: 2023-05-17

## 2023-05-07 RX ORDER — CLINDAMYCIN HYDROCHLORIDE 150 MG/1
300 CAPSULE ORAL ONCE
Status: COMPLETED | OUTPATIENT
Start: 2023-05-07 | End: 2023-05-07

## 2023-05-07 RX ORDER — TRAMADOL HYDROCHLORIDE 50 MG/1
50 TABLET ORAL EVERY 6 HOURS PRN
Qty: 12 TABLET | Refills: 0 | Status: SHIPPED | OUTPATIENT
Start: 2023-05-07 | End: 2023-05-10

## 2023-05-07 RX ADMIN — IOMEPROL INJECTION 75 ML: 714 INJECTION, SOLUTION INTRAVASCULAR at 00:36

## 2023-05-07 RX ADMIN — CLINDAMYCIN HYDROCHLORIDE 300 MG: 150 CAPSULE ORAL at 03:44

## 2023-05-07 ASSESSMENT — PAIN - FUNCTIONAL ASSESSMENT: PAIN_FUNCTIONAL_ASSESSMENT: NONE - DENIES PAIN

## 2023-05-08 LAB
EKG ATRIAL RATE: 93 BPM
EKG DIAGNOSIS: NORMAL
EKG P AXIS: 47 DEGREES
EKG P-R INTERVAL: 172 MS
EKG Q-T INTERVAL: 352 MS
EKG QRS DURATION: 80 MS
EKG QTC CALCULATION (BAZETT): 437 MS
EKG R AXIS: 24 DEGREES
EKG T AXIS: 64 DEGREES
EKG VENTRICULAR RATE: 93 BPM

## 2023-05-08 PROCEDURE — 93010 ELECTROCARDIOGRAM REPORT: CPT | Performed by: INTERNAL MEDICINE

## 2023-05-11 PROBLEM — I10 PRIMARY HYPERTENSION: Status: ACTIVE | Noted: 2023-05-11

## 2023-08-25 ENCOUNTER — APPOINTMENT (OUTPATIENT)
Dept: GENERAL RADIOLOGY | Age: 53
End: 2023-08-25
Attending: EMERGENCY MEDICINE
Payer: MEDICAID

## 2023-08-25 ENCOUNTER — HOSPITAL ENCOUNTER (EMERGENCY)
Age: 53
Discharge: HOME | End: 2023-08-25
Attending: EMERGENCY MEDICINE
Payer: MEDICAID

## 2023-08-25 ENCOUNTER — TELEPHONE (OUTPATIENT)
Dept: ORTHOPEDIC SURGERY | Age: 53
End: 2023-08-25

## 2023-08-25 VITALS
HEART RATE: 115 BPM | OXYGEN SATURATION: 98 % | WEIGHT: 162.92 LBS | HEIGHT: 67 IN | DIASTOLIC BLOOD PRESSURE: 84 MMHG | RESPIRATION RATE: 16 BRPM | BODY MASS INDEX: 25.57 KG/M2 | SYSTOLIC BLOOD PRESSURE: 127 MMHG | TEMPERATURE: 98.6 F

## 2023-08-25 DIAGNOSIS — S81.011A KNEE LACERATION, RIGHT, INITIAL ENCOUNTER: ICD-10-CM

## 2023-08-25 DIAGNOSIS — T14.90XA INJURY: ICD-10-CM

## 2023-08-25 DIAGNOSIS — S62.91XA CLOSED FRACTURE OF RIGHT HAND, INITIAL ENCOUNTER: Primary | ICD-10-CM

## 2023-08-25 PROCEDURE — 73130 X-RAY EXAM OF HAND: CPT

## 2023-08-25 PROCEDURE — 99284 EMERGENCY DEPT VISIT MOD MDM: CPT

## 2023-08-25 PROCEDURE — 29125 APPL SHORT ARM SPLINT STATIC: CPT

## 2023-08-25 PROCEDURE — 6360000002 HC RX W HCPCS: Performed by: EMERGENCY MEDICINE

## 2023-08-25 PROCEDURE — 90471 IMMUNIZATION ADMIN: CPT | Performed by: EMERGENCY MEDICINE

## 2023-08-25 PROCEDURE — 90715 TDAP VACCINE 7 YRS/> IM: CPT | Performed by: EMERGENCY MEDICINE

## 2023-08-25 PROCEDURE — 73562 X-RAY EXAM OF KNEE 3: CPT

## 2023-08-25 RX ADMIN — TETANUS TOXOID, REDUCED DIPHTHERIA TOXOID AND ACELLULAR PERTUSSIS VACCINE, ADSORBED 0.5 ML: 5; 2.5; 8; 8; 2.5 SUSPENSION INTRAMUSCULAR at 03:04

## 2023-08-25 ASSESSMENT — PAIN DESCRIPTION - LOCATION
LOCATION: KNEE

## 2023-08-25 ASSESSMENT — PAIN - FUNCTIONAL ASSESSMENT
PAIN_FUNCTIONAL_ASSESSMENT: PREVENTS OR INTERFERES SOME ACTIVE ACTIVITIES AND ADLS
PAIN_FUNCTIONAL_ASSESSMENT: 0-10
PAIN_FUNCTIONAL_ASSESSMENT: 0-10

## 2023-08-25 ASSESSMENT — LIFESTYLE VARIABLES
HOW OFTEN DO YOU HAVE A DRINK CONTAINING ALCOHOL: 4 OR MORE TIMES A WEEK
HOW MANY STANDARD DRINKS CONTAINING ALCOHOL DO YOU HAVE ON A TYPICAL DAY: 10 OR MORE

## 2023-08-25 ASSESSMENT — PAIN DESCRIPTION - DESCRIPTORS
DESCRIPTORS: ACHING;TIGHTNESS
DESCRIPTORS: ACHING

## 2023-08-25 ASSESSMENT — PAIN DESCRIPTION - ORIENTATION
ORIENTATION: RIGHT

## 2023-08-25 ASSESSMENT — PAIN SCALES - GENERAL
PAINLEVEL_OUTOF10: 1
PAINLEVEL_OUTOF10: 6
PAINLEVEL_OUTOF10: 0

## 2023-08-25 ASSESSMENT — PAIN DESCRIPTION - PAIN TYPE: TYPE: ACUTE PAIN

## 2023-08-25 ASSESSMENT — PAIN DESCRIPTION - FREQUENCY: FREQUENCY: CONTINUOUS

## 2023-08-25 ASSESSMENT — PAIN DESCRIPTION - ONSET: ONSET: ON-GOING

## 2023-08-25 NOTE — DISCHARGE INSTRUCTIONS
1. Splint to remain clean dry and intact until you follow-up with hand surgery. 2.  Tylenol or ibuprofen over-the-counter for pain. 3.  Change knee dressing daily to observe for and signs infection and then return emergency department if there are any signs of infection.   4.  Follow-up with your primary care physician call for an appointment and follow-up with a hand surgeon also call for an appointment

## 2023-08-25 NOTE — ED TRIAGE NOTES
Pt is intoxicated and somewhat of a poor historian, states he fell onto concrete floor 1 to 2 weeks ago. States he used hydrogen peroxide and covered it.

## 2023-08-25 NOTE — TELEPHONE ENCOUNTER
Did leave message regarding ED ref for a f/u appointment.  Upon return call please schedule with Dr. Kt Fischer team.

## 2023-08-31 ENCOUNTER — OFFICE VISIT (OUTPATIENT)
Dept: ORTHOPEDIC SURGERY | Age: 53
End: 2023-08-31

## 2023-08-31 VITALS — BODY MASS INDEX: 25.43 KG/M2 | HEIGHT: 67 IN | WEIGHT: 162 LBS

## 2023-08-31 DIAGNOSIS — S62.354A CLOSED NONDISPLACED FRACTURE OF SHAFT OF FOURTH METACARPAL BONE OF RIGHT HAND, INITIAL ENCOUNTER: Primary | ICD-10-CM

## 2023-08-31 DIAGNOSIS — Q68.1 CAMPTODACTYLY: ICD-10-CM

## 2023-08-31 NOTE — PROGRESS NOTES
This 52 y.o.  right hand dominant unemployed man is seen in referral for the ED at [facility]  with a chief complaint of injury to their right hand, which was injured 2 weeks ago when he fell. He noticed pain and swelling. He was evaluated at the Clinic a week later. Xrays were obtained and the patient was splinted and referred for hand/upper extremity evaluation and treatment. He stopped using the splint a few days later and presents today in a well constructed wrist/hand brace. There is no history of additional significant injury. Symptoms have improved since the date of injury. The pain assessment has been reviewed and is correct. The patient's social history, past medical history, family history, medications, allergies and review of systems, entered 8/31/23,  have been reviewed, and dated and are recorded in the chart. On physical examination the patient is Height: 5' 7\" (170.2 cm) tall and weighs Weight - Scale: 162 lb (73.5 kg). Respirations are 18 per minute. The patient is well nourished, is oriented to time and place, demonstrates appropriate mood and affect as well as normal gait and station. There is mild soft tissue swelling present about the right hand, dorsal, ulnar. There is no discoloration. There is flexion deformity of both little fingers   Tenderness is present on palpation in the area of the right hand, dorsal  Range of motion of the hand is minimally limited only on the right and is accompanied by mild pain. Skin is intact, as is distal circulation and sensation. Gross muscle strength is limited only on the right   Hand and wrist joints are stable. There are no subcutaneous nodules or enlarged epitrochlear lymph nodes.     I have personally reviewed and interpreted all previous external imaging studies(Xrays), laboratory tests(Magnesium, Protime/INR, CMP, CBC+diff, HbA1c), diagnostic proceedures and medical encounters pertinent to this patient's visit

## 2023-11-04 ENCOUNTER — HOSPITAL ENCOUNTER (EMERGENCY)
Age: 53
Discharge: HOME | End: 2023-11-04
Attending: EMERGENCY MEDICINE
Payer: MEDICAID

## 2023-11-04 VITALS
RESPIRATION RATE: 20 BRPM | HEART RATE: 98 BPM | HEIGHT: 68 IN | TEMPERATURE: 98.5 F | SYSTOLIC BLOOD PRESSURE: 126 MMHG | BODY MASS INDEX: 24.71 KG/M2 | DIASTOLIC BLOOD PRESSURE: 85 MMHG | OXYGEN SATURATION: 93 % | WEIGHT: 163 LBS

## 2023-11-04 DIAGNOSIS — N50.811 RIGHT TESTICULAR PAIN: Primary | ICD-10-CM

## 2023-11-04 LAB
BILIRUB UR QL STRIP.AUTO: NEGATIVE
CLARITY UR: CLEAR
COLOR UR: YELLOW
GLUCOSE UR STRIP.AUTO-MCNC: NEGATIVE MG/DL
HGB UR QL STRIP.AUTO: NEGATIVE
KETONES UR STRIP.AUTO-MCNC: NEGATIVE MG/DL
LEUKOCYTE ESTERASE UR QL STRIP.AUTO: NEGATIVE
NITRITE UR QL STRIP.AUTO: NEGATIVE
PH UR STRIP.AUTO: 6 [PH] (ref 5–8)
PROT UR STRIP.AUTO-MCNC: NEGATIVE MG/DL
SP GR UR STRIP.AUTO: 1 (ref 1–1.03)
UA COMPLETE W REFLEX CULTURE PNL UR: NORMAL
UA DIPSTICK W REFLEX MICRO PNL UR: NORMAL
URN SPEC COLLECT METH UR: NORMAL
UROBILINOGEN UR STRIP-ACNC: 0.2 E.U./DL

## 2023-11-04 PROCEDURE — 99283 EMERGENCY DEPT VISIT LOW MDM: CPT

## 2023-11-04 PROCEDURE — 81003 URINALYSIS AUTO W/O SCOPE: CPT

## 2023-11-04 PROCEDURE — 6370000000 HC RX 637 (ALT 250 FOR IP): Performed by: EMERGENCY MEDICINE

## 2023-11-04 RX ORDER — IBUPROFEN 400 MG/1
800 TABLET ORAL ONCE
Status: COMPLETED | OUTPATIENT
Start: 2023-11-04 | End: 2023-11-04

## 2023-11-04 RX ORDER — DOXYCYCLINE HYCLATE 100 MG
100 TABLET ORAL 2 TIMES DAILY
Qty: 20 TABLET | Refills: 0 | Status: SHIPPED | OUTPATIENT
Start: 2023-11-04 | End: 2023-11-14

## 2023-11-04 RX ORDER — DOXYCYCLINE HYCLATE 100 MG
100 TABLET ORAL ONCE
Status: COMPLETED | OUTPATIENT
Start: 2023-11-04 | End: 2023-11-04

## 2023-11-04 RX ORDER — IBUPROFEN 800 MG/1
800 TABLET ORAL 3 TIMES DAILY PRN
Qty: 15 TABLET | Refills: 0 | Status: SHIPPED | OUTPATIENT
Start: 2023-11-04 | End: 2023-11-09

## 2023-11-04 RX ADMIN — IBUPROFEN 800 MG: 400 TABLET, FILM COATED ORAL at 01:54

## 2023-11-04 RX ADMIN — DOXYCYCLINE HYCLATE 100 MG: 100 TABLET, COATED ORAL at 01:54

## 2023-11-04 ASSESSMENT — LIFESTYLE VARIABLES
HOW MANY STANDARD DRINKS CONTAINING ALCOHOL DO YOU HAVE ON A TYPICAL DAY: 1 OR 2
HOW OFTEN DO YOU HAVE A DRINK CONTAINING ALCOHOL: MONTHLY OR LESS

## 2023-11-04 ASSESSMENT — PAIN SCALES - GENERAL: PAINLEVEL_OUTOF10: 8

## 2023-11-04 ASSESSMENT — PAIN - FUNCTIONAL ASSESSMENT
PAIN_FUNCTIONAL_ASSESSMENT: NONE - DENIES PAIN
PAIN_FUNCTIONAL_ASSESSMENT: 0-10

## 2023-11-04 NOTE — ED TRIAGE NOTES
Pt arrives with complaints of right testicular pain for the past 6 months per ems. When asking patient he states \"I can't put a definite time on how long\". States painful/more frequent urination.

## 2023-11-04 NOTE — ED PROVIDER NOTES
[address] [facility] [address] [address]      Pt Name: Laura Cardona  MRN: [id number]  [address] [facility] [place] 1970  Date of evaluation: 11/4/2023  Provider: Gina Hamilton  Chief Complaint   Patient presents with    Testicle Pain     Pt arrives with complaints of right testicular pain for past 6 months according to ems; states pain radiating into abdomen; painful/more frequent urination; This patient is at risk for a communicable infection. Therefore, personal protection equipment consisting of a mask was worn for the exam.    HPI  Laura Cardona is a 52 y.o. male who presents with right testicular pain for 6 months. He came in by EMS. He states the pain is no worse than usual.  Is been the same for 6 months. Did not get worse tonight. He has had more painful urination and urinary frequency. That has been present for 1 month. He has not seen a doctor for this. He denies any fevers or chills. Denies any nausea or vomiting. He denies any back pain. REVIEW OF SYSTEMS  All systems negative except as noted in the HPI. Reviewed Nurses' notes and concur. No LMP for male patient. PAST MEDICAL HISTORY  Past Medical History:   Diagnosis Date    Asthma     Closed compression fracture of L2 vertebra, sequela     COPD (chronic obstructive pulmonary disease) (HCC)     ETOH abuse        FAMILY HISTORY  Family History   Problem Relation Age of Onset    Diabetes Mother     Asthma Father     Other Father         colostomy    Asthma Sister     Asthma Brother     Alcohol Abuse Maternal Grandfather     Cancer Paternal Grandfather         Brain tumor       SOCIAL HISTORY   reports that he has quit smoking. His smoking use included cigarettes. He started smoking about 47 years ago. He has a 40.00 pack-year smoking history.  He has never used smokeless tobacco. He reports current alcohol use of about 12.0 standard drinks of alcohol testicle/fracures testes, other    The patient's blood pressure was found to be elevated according to CMS/Medicare and the Affordable Care Act/[facility] criteria. Elevated blood pressure could occur because of pain or anxiety or other reasons and does not mean that they need to have their blood pressure treated or medications otherwise adjusted. However, this could also be a sign that they will need to have their blood pressure treated or medications changed. The patient was instructed to follow up closely with their personal physician to have their blood pressure rechecked. The patient was instructed to take a list of recent blood pressure readings to their next visit with their personal physician. See discharge instructions for specific medications, discharge information, and treatments. They were verbally instructed to return to emergency if any problems. (This chart has been completed using [employer]. Although attempts have been made to ensure accuracy, words and/or phrases may not be transcribed as intended.)    Patient requested pain medicines at the time of their exam.    IMPRESSION(S):  1. Right testicular pain        ?        Breanna Srivastava,   11/04/23 9574

## 2023-11-08 ENCOUNTER — HOSPITAL ENCOUNTER (OUTPATIENT)
Dept: ULTRASOUND IMAGING | Age: 53
Discharge: HOME | End: 2023-11-08
Attending: UROLOGY
Payer: MEDICAID

## 2023-11-08 DIAGNOSIS — N50.811 TESTICULAR PAIN, RIGHT: ICD-10-CM

## 2023-11-08 PROCEDURE — 76870 US EXAM SCROTUM: CPT

## 2024-08-31 ENCOUNTER — APPOINTMENT (OUTPATIENT)
Dept: GENERAL RADIOLOGY | Age: 54
End: 2024-08-31
Payer: MEDICAID

## 2024-08-31 ENCOUNTER — HOSPITAL ENCOUNTER (INPATIENT)
Age: 54
LOS: 1 days | Discharge: HOME | End: 2024-09-01
Payer: MEDICAID

## 2024-08-31 DIAGNOSIS — R07.9 CHEST PAIN, UNSPECIFIED TYPE: Primary | ICD-10-CM

## 2024-08-31 DIAGNOSIS — F10.10 ALCOHOL ABUSE: ICD-10-CM

## 2024-08-31 DIAGNOSIS — R11.2 NAUSEA AND VOMITING, UNSPECIFIED VOMITING TYPE: ICD-10-CM

## 2024-08-31 LAB
ALBUMIN SERPL-MCNC: 4.3 G/DL (ref 3.4–5)
ALBUMIN/GLOB SERPL: 1.9 {RATIO} (ref 1.1–2.2)
ALP SERPL-CCNC: 58 U/L (ref 40–129)
ALT SERPL-CCNC: 35 U/L (ref 10–40)
ANION GAP SERPL CALCULATED.3IONS-SCNC: 20 MMOL/L (ref 3–16)
AST SERPL-CCNC: 47 U/L (ref 15–37)
BASOPHILS # BLD: 0 K/UL (ref 0–0.2)
BASOPHILS NFR BLD: 0.5 %
BILIRUB SERPL-MCNC: 1 MG/DL (ref 0–1)
BUN SERPL-MCNC: 12 MG/DL (ref 7–20)
CALCIUM SERPL-MCNC: 9 MG/DL (ref 8.3–10.6)
CHLORIDE SERPL-SCNC: 94 MMOL/L (ref 99–110)
CO2 SERPL-SCNC: 22 MMOL/L (ref 21–32)
CREAT SERPL-MCNC: 0.7 MG/DL (ref 0.9–1.3)
DEPRECATED RDW RBC AUTO: 13.8 % (ref 12.4–15.4)
EOSINOPHIL # BLD: 0 K/UL (ref 0–0.6)
EOSINOPHIL NFR BLD: 0.2 %
ETHANOLAMINE SERPL-MCNC: 10 MG/DL (ref 0–0.08)
FERRITIN SERPL IA-MCNC: 127 NG/ML (ref 30–400)
GFR SERPLBLD CREATININE-BSD FMLA CKD-EPI: >90 ML/MIN/{1.73_M2}
GLUCOSE SERPL-MCNC: 120 MG/DL (ref 70–99)
HCT VFR BLD AUTO: 39.5 % (ref 40.5–52.5)
HGB BLD-MCNC: 13.6 G/DL (ref 13.5–17.5)
INR PPP: 0.87 (ref 0.85–1.15)
LYMPHOCYTES # BLD: 0.6 K/UL (ref 1–5.1)
LYMPHOCYTES NFR BLD: 6.3 %
MAGNESIUM SERPL-MCNC: 2.02 MG/DL (ref 1.8–2.4)
MCH RBC QN AUTO: 34.2 PG (ref 26–34)
MCHC RBC AUTO-ENTMCNC: 34.6 G/DL (ref 31–36)
MCV RBC AUTO: 99 FL (ref 80–100)
MONOCYTES # BLD: 0.6 K/UL (ref 0–1.3)
MONOCYTES NFR BLD: 6.2 %
NEUTROPHILS # BLD: 8.6 K/UL (ref 1.7–7.7)
NEUTROPHILS NFR BLD: 86.8 %
NT-PROBNP SERPL-MCNC: <36 PG/ML (ref 0–124)
PLATELET # BLD AUTO: 293 K/UL (ref 135–450)
PMV BLD AUTO: 7.9 FL (ref 5–10.5)
POTASSIUM SERPL-SCNC: 4 MMOL/L (ref 3.5–5.1)
PROT SERPL-MCNC: 6.6 G/DL (ref 6.4–8.2)
PROTHROMBIN TIME: 12.1 SEC (ref 11.9–14.9)
RBC # BLD AUTO: 3.99 M/UL (ref 4.2–5.9)
SODIUM SERPL-SCNC: 136 MMOL/L (ref 136–145)
TROPONIN, HIGH SENSITIVITY: 10 NG/L (ref 0–22)
TROPONIN, HIGH SENSITIVITY: 10 NG/L (ref 0–22)
TROPONIN, HIGH SENSITIVITY: 8 NG/L (ref 0–22)
TSH SERPL DL<=0.005 MIU/L-ACNC: 1.47 UIU/ML (ref 0.27–4.2)
WBC # BLD AUTO: 9.9 K/UL (ref 4–11)

## 2024-08-31 PROCEDURE — 82077 ASSAY SPEC XCP UR&BREATH IA: CPT

## 2024-08-31 PROCEDURE — 85025 COMPLETE CBC W/AUTO DIFF WBC: CPT

## 2024-08-31 PROCEDURE — 80053 COMPREHEN METABOLIC PANEL: CPT

## 2024-08-31 PROCEDURE — 83735 ASSAY OF MAGNESIUM: CPT

## 2024-08-31 PROCEDURE — 84484 ASSAY OF TROPONIN QUANT: CPT

## 2024-08-31 PROCEDURE — 82728 ASSAY OF FERRITIN: CPT

## 2024-08-31 PROCEDURE — 1200000000 HC SEMI PRIVATE

## 2024-08-31 PROCEDURE — 96375 TX/PRO/DX INJ NEW DRUG ADDON: CPT

## 2024-08-31 PROCEDURE — 93005 ELECTROCARDIOGRAM TRACING: CPT | Performed by: NURSE PRACTITIONER

## 2024-08-31 PROCEDURE — 36415 COLL VENOUS BLD VENIPUNCTURE: CPT

## 2024-08-31 PROCEDURE — 82746 ASSAY OF FOLIC ACID SERUM: CPT

## 2024-08-31 PROCEDURE — 2500000003 HC RX 250 WO HCPCS: Performed by: NURSE PRACTITIONER

## 2024-08-31 PROCEDURE — 96374 THER/PROPH/DIAG INJ IV PUSH: CPT

## 2024-08-31 PROCEDURE — 2580000003 HC RX 258: Performed by: NURSE PRACTITIONER

## 2024-08-31 PROCEDURE — 99285 EMERGENCY DEPT VISIT HI MDM: CPT

## 2024-08-31 PROCEDURE — 71045 X-RAY EXAM CHEST 1 VIEW: CPT

## 2024-08-31 PROCEDURE — 93005 ELECTROCARDIOGRAM TRACING: CPT

## 2024-08-31 PROCEDURE — 84443 ASSAY THYROID STIM HORMONE: CPT

## 2024-08-31 PROCEDURE — 82607 VITAMIN B-12: CPT

## 2024-08-31 PROCEDURE — 6370000000 HC RX 637 (ALT 250 FOR IP): Performed by: NURSE PRACTITIONER

## 2024-08-31 PROCEDURE — 83880 ASSAY OF NATRIURETIC PEPTIDE: CPT

## 2024-08-31 PROCEDURE — 85610 PROTHROMBIN TIME: CPT

## 2024-08-31 PROCEDURE — 6360000002 HC RX W HCPCS: Performed by: NURSE PRACTITIONER

## 2024-08-31 RX ORDER — GABAPENTIN 100 MG/1
100 CAPSULE ORAL NIGHTLY
Status: DISCONTINUED | OUTPATIENT
Start: 2024-08-31 | End: 2024-09-01

## 2024-08-31 RX ORDER — ACETAMINOPHEN 650 MG/1
650 SUPPOSITORY RECTAL EVERY 6 HOURS PRN
Status: DISCONTINUED | OUTPATIENT
Start: 2024-08-31 | End: 2024-09-01 | Stop reason: HOSPADM

## 2024-08-31 RX ORDER — THIAMINE MONONITRATE (VIT B1) 100 MG
100 TABLET ORAL DAILY
Qty: 30 TABLET | Refills: 1 | Status: SHIPPED | OUTPATIENT
Start: 2024-08-31

## 2024-08-31 RX ORDER — SODIUM CHLORIDE 0.9 % (FLUSH) 0.9 %
5-40 SYRINGE (ML) INJECTION EVERY 12 HOURS SCHEDULED
Status: DISCONTINUED | OUTPATIENT
Start: 2024-08-31 | End: 2024-09-01 | Stop reason: HOSPADM

## 2024-08-31 RX ORDER — LORAZEPAM 2 MG/ML
1 INJECTION INTRAMUSCULAR EVERY 6 HOURS PRN
Status: DISCONTINUED | OUTPATIENT
Start: 2024-08-31 | End: 2024-08-31

## 2024-08-31 RX ORDER — LORAZEPAM 2 MG/ML
0.5 INJECTION INTRAMUSCULAR ONCE
Status: COMPLETED | OUTPATIENT
Start: 2024-08-31 | End: 2024-08-31

## 2024-08-31 RX ORDER — NITROGLYCERIN 0.4 MG/1
0.4 TABLET SUBLINGUAL EVERY 5 MIN PRN
Status: DISCONTINUED | OUTPATIENT
Start: 2024-08-31 | End: 2024-09-01 | Stop reason: HOSPADM

## 2024-08-31 RX ORDER — FOLIC ACID 1 MG/1
1 TABLET ORAL DAILY
Qty: 30 TABLET | Refills: 1 | Status: SHIPPED | OUTPATIENT
Start: 2024-08-31

## 2024-08-31 RX ORDER — ONDANSETRON 2 MG/ML
4 INJECTION INTRAMUSCULAR; INTRAVENOUS EVERY 6 HOURS PRN
Status: DISCONTINUED | OUTPATIENT
Start: 2024-08-31 | End: 2024-09-01 | Stop reason: HOSPADM

## 2024-08-31 RX ORDER — ASPIRIN 325 MG
325 TABLET, DELAYED RELEASE (ENTERIC COATED) ORAL ONCE
Status: DISCONTINUED | OUTPATIENT
Start: 2024-08-31 | End: 2024-09-01 | Stop reason: HOSPADM

## 2024-08-31 RX ORDER — ENOXAPARIN SODIUM 100 MG/ML
40 INJECTION SUBCUTANEOUS DAILY
Status: DISCONTINUED | OUTPATIENT
Start: 2024-09-01 | End: 2024-09-01 | Stop reason: HOSPADM

## 2024-08-31 RX ORDER — ALBUTEROL SULFATE 0.83 MG/ML
2.5 SOLUTION RESPIRATORY (INHALATION) EVERY 6 HOURS PRN
Status: DISCONTINUED | OUTPATIENT
Start: 2024-08-31 | End: 2024-09-01 | Stop reason: HOSPADM

## 2024-08-31 RX ORDER — SODIUM CHLORIDE, SODIUM LACTATE, POTASSIUM CHLORIDE, CALCIUM CHLORIDE 600; 310; 30; 20 MG/100ML; MG/100ML; MG/100ML; MG/100ML
INJECTION, SOLUTION INTRAVENOUS CONTINUOUS
Status: DISCONTINUED | OUTPATIENT
Start: 2024-08-31 | End: 2024-08-31

## 2024-08-31 RX ORDER — ALBUTEROL SULFATE 90 UG/1
2 AEROSOL, METERED RESPIRATORY (INHALATION) EVERY 6 HOURS PRN
Status: DISCONTINUED | OUTPATIENT
Start: 2024-08-31 | End: 2024-09-01 | Stop reason: HOSPADM

## 2024-08-31 RX ORDER — DIAZEPAM 5 MG
20 TABLET ORAL
Status: DISCONTINUED | OUTPATIENT
Start: 2024-08-31 | End: 2024-08-31

## 2024-08-31 RX ORDER — THIAMINE HYDROCHLORIDE 100 MG/ML
100 INJECTION, SOLUTION INTRAMUSCULAR; INTRAVENOUS ONCE
Status: COMPLETED | OUTPATIENT
Start: 2024-08-31 | End: 2024-08-31

## 2024-08-31 RX ORDER — BUDESONIDE AND FORMOTEROL FUMARATE DIHYDRATE 160; 4.5 UG/1; UG/1
2 AEROSOL RESPIRATORY (INHALATION)
Status: DISCONTINUED | OUTPATIENT
Start: 2024-08-31 | End: 2024-09-01 | Stop reason: HOSPADM

## 2024-08-31 RX ORDER — POLYETHYLENE GLYCOL 3350 17 G/17G
17 POWDER, FOR SOLUTION ORAL DAILY PRN
Status: DISCONTINUED | OUTPATIENT
Start: 2024-08-31 | End: 2024-09-01 | Stop reason: HOSPADM

## 2024-08-31 RX ORDER — LANOLIN ALCOHOL/MO/W.PET/CERES
6 CREAM (GRAM) TOPICAL ONCE
Status: COMPLETED | OUTPATIENT
Start: 2024-08-31 | End: 2024-09-01

## 2024-08-31 RX ORDER — 0.9 % SODIUM CHLORIDE 0.9 %
1000 INTRAVENOUS SOLUTION INTRAVENOUS ONCE
Status: COMPLETED | OUTPATIENT
Start: 2024-08-31 | End: 2024-08-31

## 2024-08-31 RX ORDER — IBUPROFEN 400 MG/1
800 TABLET, FILM COATED ORAL 3 TIMES DAILY PRN
Status: DISCONTINUED | OUTPATIENT
Start: 2024-08-31 | End: 2024-09-01 | Stop reason: HOSPADM

## 2024-08-31 RX ORDER — SODIUM CHLORIDE 9 MG/ML
INJECTION, SOLUTION INTRAVENOUS PRN
Status: DISCONTINUED | OUTPATIENT
Start: 2024-08-31 | End: 2024-09-01 | Stop reason: HOSPADM

## 2024-08-31 RX ORDER — MULTIVITAMIN
1 TABLET ORAL DAILY
Qty: 30 TABLET | Refills: 1 | Status: SHIPPED | OUTPATIENT
Start: 2024-08-31

## 2024-08-31 RX ORDER — POTASSIUM CHLORIDE 7.45 MG/ML
10 INJECTION INTRAVENOUS PRN
Status: DISCONTINUED | OUTPATIENT
Start: 2024-08-31 | End: 2024-09-01 | Stop reason: HOSPADM

## 2024-08-31 RX ORDER — DIAZEPAM 5 MG
15 TABLET ORAL
Status: DISCONTINUED | OUTPATIENT
Start: 2024-08-31 | End: 2024-08-31

## 2024-08-31 RX ORDER — MULTIVITAMIN WITH IRON
1 TABLET ORAL ONCE
Status: COMPLETED | OUTPATIENT
Start: 2024-08-31 | End: 2024-08-31

## 2024-08-31 RX ORDER — CHLORDIAZEPOXIDE HYDROCHLORIDE 25 MG/1
25 CAPSULE, GELATIN COATED ORAL 4 TIMES DAILY
Status: DISCONTINUED | OUTPATIENT
Start: 2024-08-31 | End: 2024-09-01 | Stop reason: HOSPADM

## 2024-08-31 RX ORDER — FOLIC ACID 1 MG/1
1 TABLET ORAL DAILY
Status: DISCONTINUED | OUTPATIENT
Start: 2024-08-31 | End: 2024-09-01 | Stop reason: HOSPADM

## 2024-08-31 RX ORDER — SODIUM CHLORIDE 9 MG/ML
INJECTION, SOLUTION INTRAVENOUS CONTINUOUS
Status: DISCONTINUED | OUTPATIENT
Start: 2024-08-31 | End: 2024-09-01 | Stop reason: HOSPADM

## 2024-08-31 RX ORDER — DIAZEPAM 5 MG
10 TABLET ORAL
Status: DISCONTINUED | OUTPATIENT
Start: 2024-08-31 | End: 2024-08-31

## 2024-08-31 RX ORDER — MAGNESIUM SULFATE IN WATER 40 MG/ML
2000 INJECTION, SOLUTION INTRAVENOUS PRN
Status: DISCONTINUED | OUTPATIENT
Start: 2024-08-31 | End: 2024-09-01 | Stop reason: HOSPADM

## 2024-08-31 RX ORDER — FLUTICASONE FUROATE, UMECLIDINIUM BROMIDE AND VILANTEROL TRIFENATATE 100; 62.5; 25 UG/1; UG/1; UG/1
1 POWDER RESPIRATORY (INHALATION) DAILY
COMMUNITY

## 2024-08-31 RX ORDER — ONDANSETRON 4 MG/1
4 TABLET, ORALLY DISINTEGRATING ORAL 3 TIMES DAILY PRN
Qty: 21 TABLET | Refills: 0 | Status: SHIPPED | OUTPATIENT
Start: 2024-08-31

## 2024-08-31 RX ORDER — ONDANSETRON 4 MG/1
4 TABLET, ORALLY DISINTEGRATING ORAL EVERY 8 HOURS PRN
Status: DISCONTINUED | OUTPATIENT
Start: 2024-08-31 | End: 2024-09-01 | Stop reason: HOSPADM

## 2024-08-31 RX ORDER — LISINOPRIL 5 MG/1
2.5 TABLET ORAL DAILY
Status: DISCONTINUED | OUTPATIENT
Start: 2024-09-01 | End: 2024-09-01 | Stop reason: HOSPADM

## 2024-08-31 RX ORDER — 0.9 % SODIUM CHLORIDE 0.9 %
500 INTRAVENOUS SOLUTION INTRAVENOUS ONCE
Status: COMPLETED | OUTPATIENT
Start: 2024-08-31 | End: 2024-08-31

## 2024-08-31 RX ORDER — DIAZEPAM 5 MG
5 TABLET ORAL
Status: DISCONTINUED | OUTPATIENT
Start: 2024-08-31 | End: 2024-08-31

## 2024-08-31 RX ORDER — PHENOL 1.4 %
1 AEROSOL, SPRAY (ML) MUCOUS MEMBRANE 2 TIMES DAILY
COMMUNITY

## 2024-08-31 RX ORDER — ACETAMINOPHEN 325 MG/1
650 TABLET ORAL EVERY 6 HOURS PRN
Status: DISCONTINUED | OUTPATIENT
Start: 2024-08-31 | End: 2024-09-01 | Stop reason: HOSPADM

## 2024-08-31 RX ORDER — ONDANSETRON 2 MG/ML
4 INJECTION INTRAMUSCULAR; INTRAVENOUS ONCE
Status: COMPLETED | OUTPATIENT
Start: 2024-08-31 | End: 2024-08-31

## 2024-08-31 RX ORDER — SODIUM CHLORIDE 0.9 % (FLUSH) 0.9 %
5-40 SYRINGE (ML) INJECTION PRN
Status: DISCONTINUED | OUTPATIENT
Start: 2024-08-31 | End: 2024-09-01 | Stop reason: HOSPADM

## 2024-08-31 RX ORDER — POTASSIUM CHLORIDE 1500 MG/1
40 TABLET, EXTENDED RELEASE ORAL PRN
Status: DISCONTINUED | OUTPATIENT
Start: 2024-08-31 | End: 2024-09-01 | Stop reason: HOSPADM

## 2024-08-31 RX ADMIN — FAMOTIDINE 20 MG: 10 INJECTION, SOLUTION INTRAVENOUS at 17:05

## 2024-08-31 RX ADMIN — NITROGLYCERIN 0.4 MG: 0.4 TABLET SUBLINGUAL at 18:08

## 2024-08-31 RX ADMIN — THIAMINE HYDROCHLORIDE 100 MG: 100 INJECTION, SOLUTION INTRAMUSCULAR; INTRAVENOUS at 18:07

## 2024-08-31 RX ADMIN — SODIUM CHLORIDE 500 ML: 9 INJECTION, SOLUTION INTRAVENOUS at 19:23

## 2024-08-31 RX ADMIN — ONDANSETRON 4 MG: 2 INJECTION INTRAMUSCULAR; INTRAVENOUS at 17:11

## 2024-08-31 RX ADMIN — THERA TABS 1 TABLET: TAB at 18:05

## 2024-08-31 RX ADMIN — SODIUM CHLORIDE 1000 ML: 9 INJECTION, SOLUTION INTRAVENOUS at 17:09

## 2024-08-31 RX ADMIN — LORAZEPAM 0.5 MG: 2 INJECTION INTRAMUSCULAR; INTRAVENOUS at 17:55

## 2024-08-31 RX ADMIN — FOLIC ACID 1 MG: 1 TABLET ORAL at 18:05

## 2024-08-31 ASSESSMENT — PAIN - FUNCTIONAL ASSESSMENT: PAIN_FUNCTIONAL_ASSESSMENT: 0-10

## 2024-08-31 ASSESSMENT — PAIN SCALES - GENERAL
PAINLEVEL_OUTOF10: 8
PAINLEVEL_OUTOF10: 8

## 2024-08-31 ASSESSMENT — PAIN DESCRIPTION - ORIENTATION: ORIENTATION: LEFT

## 2024-08-31 ASSESSMENT — PAIN DESCRIPTION - LOCATION: LOCATION: CHEST

## 2024-08-31 ASSESSMENT — PAIN DESCRIPTION - DESCRIPTORS: DESCRIPTORS: ACHING

## 2024-08-31 ASSESSMENT — ENCOUNTER SYMPTOMS
RESPIRATORY NEGATIVE: 1
GASTROINTESTINAL NEGATIVE: 1

## 2024-08-31 NOTE — ED PROVIDER NOTES
[facility] EMERGENCY DEPARTMENT  [address] [facility] [address]  Dept: [phone]  Loc: [phone]  eMERGENCYdEPARTMENT eNCOUnter      Pt Name: Jefe Molina  MRN: [id number]  Birthdate 1970  Date of evaluation: 8/31/2024  Provider:MACARIO Thorne CNP      Independently seen and evaluated by the advanced practice provider  CHIEF COMPLAINT       Chief Complaint   Patient presents with    Chest Pain     Chest pain, palpitations, tremors and vomitting while at work. Patient states feels like his heart is blocked. 324mg aspirin given by ems       CRITICAL CARE TIME       HISTORY OF PRESENT ILLNESS  (Location/Symptom, Timing/Onset, Context/Setting, Quality, Duration,Modifying Factors, Severity.)   Jefe Molina is a 54 y.o. male who presents to the emergency department PMHx: Alcohol abuse, COPD, hypertension and tobacco smoker    Lives at home  CODE STATUS full  Anticoagulation therapy none  Social determinants: Works at [employer]    HPI provided by the patient.  Patient presents to the emergency department complaining of tremors nausea vomiting and left-sided chest pain that started when he was in the shower this morning.  Reports that he last drank alcohol \"I got shit faced 3 days ago.\"  States that he sat in his recliner chair and did not really do anything for 2 days.  Got up today and got the shower and symptoms started.  After leaving his home he walked to [employer] his place of employment and then had 2 episodes of vomiting and had to call 911.  Reports that he has a twinging      Nursing Notes were reviewedand agreed with or any disagreements were addressed in the HPI.    REVIEW OF SYSTEMS    (2-9 systems for level 4, 10 or more for level 5)     Review of Systems   Constitutional:  Positive for activity change.   HENT: Negative.     Respiratory: Negative.     Cardiovascular:  Positive for chest pain.

## 2024-09-01 VITALS
SYSTOLIC BLOOD PRESSURE: 127 MMHG | HEART RATE: 94 BPM | BODY MASS INDEX: 24.76 KG/M2 | TEMPERATURE: 97.5 F | WEIGHT: 163.36 LBS | DIASTOLIC BLOOD PRESSURE: 78 MMHG | OXYGEN SATURATION: 96 % | RESPIRATION RATE: 16 BRPM | HEIGHT: 68 IN

## 2024-09-01 LAB
EKG ATRIAL RATE: 100 BPM
EKG ATRIAL RATE: 100 BPM
EKG ATRIAL RATE: 92 BPM
EKG DIAGNOSIS: NORMAL
EKG P AXIS: 44 DEGREES
EKG P AXIS: 51 DEGREES
EKG P AXIS: 53 DEGREES
EKG P-R INTERVAL: 164 MS
EKG P-R INTERVAL: 164 MS
EKG P-R INTERVAL: 176 MS
EKG Q-T INTERVAL: 326 MS
EKG Q-T INTERVAL: 336 MS
EKG Q-T INTERVAL: 354 MS
EKG QRS DURATION: 76 MS
EKG QRS DURATION: 76 MS
EKG QRS DURATION: 78 MS
EKG QTC CALCULATION (BAZETT): 420 MS
EKG QTC CALCULATION (BAZETT): 433 MS
EKG QTC CALCULATION (BAZETT): 437 MS
EKG R AXIS: 26 DEGREES
EKG R AXIS: 42 DEGREES
EKG R AXIS: 52 DEGREES
EKG T AXIS: 61 DEGREES
EKG T AXIS: 66 DEGREES
EKG T AXIS: 70 DEGREES
EKG VENTRICULAR RATE: 100 BPM
EKG VENTRICULAR RATE: 100 BPM
EKG VENTRICULAR RATE: 92 BPM
FOLATE SERPL-MCNC: 13.1 NG/ML (ref 4.78–24.2)
VIT B12 SERPL-MCNC: 1745 PG/ML (ref 211–911)

## 2024-09-01 PROCEDURE — 6370000000 HC RX 637 (ALT 250 FOR IP)

## 2024-09-01 PROCEDURE — 6370000000 HC RX 637 (ALT 250 FOR IP): Performed by: NURSE PRACTITIONER

## 2024-09-01 PROCEDURE — 6360000002 HC RX W HCPCS

## 2024-09-01 PROCEDURE — 94640 AIRWAY INHALATION TREATMENT: CPT

## 2024-09-01 PROCEDURE — 2580000003 HC RX 258

## 2024-09-01 PROCEDURE — 94760 N-INVAS EAR/PLS OXIMETRY 1: CPT

## 2024-09-01 PROCEDURE — 93010 ELECTROCARDIOGRAM REPORT: CPT | Performed by: INTERNAL MEDICINE

## 2024-09-01 RX ORDER — LORAZEPAM 1 MG/1
3 TABLET ORAL
Status: DISCONTINUED | OUTPATIENT
Start: 2024-09-01 | End: 2024-09-01 | Stop reason: HOSPADM

## 2024-09-01 RX ORDER — PANTOPRAZOLE SODIUM 40 MG/1
40 TABLET, DELAYED RELEASE ORAL
Qty: 60 TABLET | Refills: 3 | Status: SHIPPED | OUTPATIENT
Start: 2024-09-01

## 2024-09-01 RX ORDER — NICOTINE 21 MG/24HR
1 PATCH, TRANSDERMAL 24 HOURS TRANSDERMAL DAILY
Status: DISCONTINUED | OUTPATIENT
Start: 2024-09-01 | End: 2024-09-01 | Stop reason: HOSPADM

## 2024-09-01 RX ORDER — DIAZEPAM 5 MG
5 TABLET ORAL 3 TIMES DAILY
Status: DISCONTINUED | OUTPATIENT
Start: 2024-09-01 | End: 2024-09-01 | Stop reason: HOSPADM

## 2024-09-01 RX ORDER — SODIUM CHLORIDE 0.9 % (FLUSH) 0.9 %
5-40 SYRINGE (ML) INJECTION EVERY 12 HOURS SCHEDULED
Status: DISCONTINUED | OUTPATIENT
Start: 2024-09-01 | End: 2024-09-01 | Stop reason: HOSPADM

## 2024-09-01 RX ORDER — GAUZE BANDAGE 2" X 2"
100 BANDAGE TOPICAL DAILY
Status: DISCONTINUED | OUTPATIENT
Start: 2024-09-01 | End: 2024-09-01 | Stop reason: HOSPADM

## 2024-09-01 RX ORDER — LORAZEPAM 1 MG/1
1 TABLET ORAL
Status: DISCONTINUED | OUTPATIENT
Start: 2024-09-01 | End: 2024-09-01 | Stop reason: HOSPADM

## 2024-09-01 RX ORDER — SODIUM CHLORIDE 0.9 % (FLUSH) 0.9 %
5-40 SYRINGE (ML) INJECTION PRN
Status: DISCONTINUED | OUTPATIENT
Start: 2024-09-01 | End: 2024-09-01 | Stop reason: HOSPADM

## 2024-09-01 RX ORDER — LORAZEPAM 2 MG/ML
3 INJECTION INTRAMUSCULAR
Status: DISCONTINUED | OUTPATIENT
Start: 2024-09-01 | End: 2024-09-01 | Stop reason: HOSPADM

## 2024-09-01 RX ORDER — LORAZEPAM 1 MG/1
4 TABLET ORAL
Status: DISCONTINUED | OUTPATIENT
Start: 2024-09-01 | End: 2024-09-01 | Stop reason: HOSPADM

## 2024-09-01 RX ORDER — LORAZEPAM 1 MG/1
2 TABLET ORAL
Status: DISCONTINUED | OUTPATIENT
Start: 2024-09-01 | End: 2024-09-01 | Stop reason: HOSPADM

## 2024-09-01 RX ORDER — NITROGLYCERIN 0.4 MG/1
0.4 TABLET SUBLINGUAL EVERY 5 MIN PRN
Qty: 25 TABLET | Refills: 3 | Status: SHIPPED | OUTPATIENT
Start: 2024-09-01

## 2024-09-01 RX ORDER — LORAZEPAM 2 MG/ML
1 INJECTION INTRAMUSCULAR
Status: DISCONTINUED | OUTPATIENT
Start: 2024-09-01 | End: 2024-09-01 | Stop reason: HOSPADM

## 2024-09-01 RX ORDER — LORAZEPAM 2 MG/ML
4 INJECTION INTRAMUSCULAR
Status: DISCONTINUED | OUTPATIENT
Start: 2024-09-01 | End: 2024-09-01 | Stop reason: HOSPADM

## 2024-09-01 RX ORDER — LORAZEPAM 2 MG/ML
2 INJECTION INTRAMUSCULAR
Status: DISCONTINUED | OUTPATIENT
Start: 2024-09-01 | End: 2024-09-01 | Stop reason: HOSPADM

## 2024-09-01 RX ORDER — PANTOPRAZOLE SODIUM 40 MG/1
40 TABLET, DELAYED RELEASE ORAL
Status: DISCONTINUED | OUTPATIENT
Start: 2024-09-01 | End: 2024-09-01 | Stop reason: HOSPADM

## 2024-09-01 RX ORDER — SODIUM CHLORIDE 9 MG/ML
INJECTION, SOLUTION INTRAVENOUS PRN
Status: DISCONTINUED | OUTPATIENT
Start: 2024-09-01 | End: 2024-09-01 | Stop reason: HOSPADM

## 2024-09-01 RX ADMIN — CHLORDIAZEPOXIDE HYDROCHLORIDE 25 MG: 25 CAPSULE ORAL at 00:13

## 2024-09-01 RX ADMIN — SODIUM CHLORIDE: 9 INJECTION, SOLUTION INTRAVENOUS at 00:16

## 2024-09-01 RX ADMIN — ENOXAPARIN SODIUM 40 MG: 100 INJECTION SUBCUTANEOUS at 08:37

## 2024-09-01 RX ADMIN — CHLORDIAZEPOXIDE HYDROCHLORIDE 25 MG: 25 CAPSULE ORAL at 08:36

## 2024-09-01 RX ADMIN — DIAZEPAM 5 MG: 5 TABLET ORAL at 08:36

## 2024-09-01 RX ADMIN — BUDESONIDE AND FORMOTEROL FUMARATE DIHYDRATE 2 PUFF: 160; 4.5 AEROSOL RESPIRATORY (INHALATION) at 09:14

## 2024-09-01 RX ADMIN — FOLIC ACID 1 MG: 1 TABLET ORAL at 08:36

## 2024-09-01 RX ADMIN — TIOTROPIUM BROMIDE INHALATION SPRAY 1 PUFF: 3.12 SPRAY, METERED RESPIRATORY (INHALATION) at 09:14

## 2024-09-01 RX ADMIN — Medication 100 MG: at 08:36

## 2024-09-01 RX ADMIN — LISINOPRIL 2.5 MG: 5 TABLET ORAL at 08:36

## 2024-09-01 RX ADMIN — Medication 6 MG: at 00:13

## 2024-09-01 NOTE — DISCHARGE SUMMARY
Hospitalist Discharge Summary     Jefe Molina  : 1970  MRN: [id number]    Admit date: 2024  Discharge date: 2024    Admitting Physician: Eva Nelson MD    Discharge Diagnoses:   Patient Active Problem List   Diagnosis    Bilateral carpal tunnel syndrome    Alcohol withdrawal syndrome with complication (HCC)    Tobacco abuse    Primary hypertension    Closed nondisplaced fracture of shaft of fourth metacarpal bone of right hand    Camptodactyly    Chest pain at rest       Admission Condition: fair    Discharged Condition: good    Discharge Exam:  VITALS:  /78   Pulse 94   Temp 97.5 °F (36.4 °C) (Oral)   Resp 16   Ht 1.727 m (5' 8\")   Wt 74.1 kg (163 lb 5.8 oz)   SpO2 96%   BMI 24.84 kg/m²   CONSTITUTIONAL:  awake, alert, cooperative, no apparent distress, and appears stated age  EYES:  Lids and lashes normal, PERRL, EOMI, sclera clear, conjunctiva normal  ENT:  NC/AT, MMM    NECK:  Supple, symmetrical, trachea midline, no adenopathy  HEMATOLOGIC/LYMPHATICS:  no cervical, supraclavicular or axillary lymphadenopathy  LUNGS:  clear to auscultation bilaterally, No increased work of breathing, good air exchange, no crackles or wheezing  CARDIOVASCULAR:  Regular rate and rhythm, normal S1 and S2, no S3 or S4, and no significant murmurs, rubs or gallops noted. Normal apical impulse,   ABDOMEN:  Normal active bowel sounds, soft, non-tender, non-distended, no masses palpated, no organomegally  MUSCULOSKELETAL:  Full range of motion noted.     NEUROLOGIC:  Awake, alert, oriented to name, place and time.  Cranial nerves II-XII are grossly intact.        Hospital Course:     Jefe Molina is a 54 y.o. male with a pmh of COPD, tobacco dependency, asthma, alcohol abuse, hypertension who presents with Chest pain at rest     #Chest pain unspecified currently resolved  - Initially tachycardic with elevated blood pressure. Resolved  - Trope negative, BNP negative.  No

## 2024-09-01 NOTE — CARE COORDINATION
Discharge order noted. SW called [facility] transportation for ride service from [facility] to [facility] pharmacy per his request at 2 pm. Will receive a phone call or text within 15 min of arrival time.     Confirmation number is [id number]    [phone] is the number direct to transportation.    Respectfully submitted,    TRENTON Nevarez  [facility]   [phone]    Electronically signed by KSENIA Suresh, JAVIERW on 9/1/2024 at 12:28 PM

## 2024-09-01 NOTE — PROGRESS NOTES
Pt admitted to room 4112 from ED via stretcher. Ambulated to standing scale then to bed independently, tolerated well. Bed locked and in lowest position, call light and bedside table within reach. VSS on RA. Pt A&Ox4, oriented to room and call light. Telemetry placed and confirmed with [facility] per orders. 75 of NS running to LAC PIV. Admission questions and assessment completed. Pt encouraged to call for needs. No needs voiced at this time.    Electronically signed by Chelsea Cho RN on 8/31/2024 at 11:24 PM

## 2024-09-01 NOTE — DISCHARGE INSTR - COC
ADLs:137802734}  Bathing  {[facility] DME ADLs:857080920}  Dressing  {[facility] DME ADLs:268634230}  Toileting  {[facility] DME ADLs:593673460}  Feeding  {[facility] DME ADLs:986168082}  Med Admin  {[facility] DME ADLs:004298435}  Med Delivery   { [facility] MED Delivery:790742462}    Wound Care Documentation and Therapy:        Elimination:  Continence:   Bowel: {YES / NO:}  Bladder: {YES / NO:}  Urinary Catheter: {Urinary Catheter:911054919}   Colostomy/Ileostomy/Ileal Conduit: {YES / NO:}       Date of Last BM: ***  No intake or output data in the 24 hours ending 24 1220  No intake/output data recorded.    Safety Concerns:     { [facility] Safety Concerns:809314444}    Impairments/Disabilities:      { [facility] Impairments/Disabilities:562588588}    Nutrition Therapy:  Current Nutrition Therapy:   { [facility] Diet List:537806039}    Routes of Feeding: {[facility] DME Other Feedings:837731859}  Liquids: {Slp liquid thickness:42241}  Daily Fluid Restriction: {P DME Yes amt example:594992641}  Last Modified Barium Swallow with Video (Video Swallowing Test): {Done Not Done Date:}    Treatments at the Time of Hospital Discharge:   Respiratory Treatments: ***  Oxygen Therapy:  {Therapy; copd oxygen:27742}  Ventilator:    {[facility] Vent List:744797266}    Rehab Therapies: {THERAPEUTIC INTERVENTION:3454583751}  Weight Bearing Status/Restrictions: {[facility] Weight Bearin}  Other Medical Equipment (for information only, NOT a DME order):  {EQUIPMENT:842234791}  Other Treatments: ***    Patient's personal belongings (please select all that are sent with patient):  {[facility] DME Belongings:340196357}    RN SIGNATURE:  {Esignature:006455036}    CASE MANAGEMENT/SOCIAL WORK SECTION    Inpatient Status Date: ***    Readmission Risk Assessment Score:  Readmission Risk              Risk of Unplanned Readmission:  9           Discharging to Facility/ Agency   Name:   Address:  Phone:  Fax:    Dialysis Facility (if applicable)   Name:  Address:  Dialysis

## 2024-09-01 NOTE — ED PROVIDER NOTES
ECG #1  The Ekg interpreted by me shows  normal sinus rhythm with a rate of 100  Axis is   Normal  QTc is  normal  Intervals and Durations are unremarkable.      ST Segments: <=1 mm of NADYA in V2-V4 without reciprocal changes  When compared to an EKG performed on May 6, 2023 these changes are more pronounced     ECG #2 (1703)  The Ekg interpreted by me shows  normal sinus rhythm with a rate of 100  Axis is   Normal  QTc is  normal  Intervals and Durations are unremarkable.      ST Segments: nonspecific changes  Less pronounced ST changes from prior EKG today     ECG #3(1927)  The Ekg interpreted by me shows  normal sinus rhythm with a rate of 92  Axis is   Normal  QTc is  normal  Intervals and Durations are unremarkable.      ST Segments: nonspecific changes     Rach, Larry Reece MD  08/31/24 2013

## 2024-09-01 NOTE — PLAN OF CARE
Problem: Discharge Planning  Goal: Discharge to home or other facility with appropriate resources  9/1/2024 1332 by Klarissa Brown, RN  Outcome: Completed  9/1/2024 1332 by Klarissa Brown, RN  Outcome: Progressing     Problem: Pain  Goal: Verbalizes/displays adequate comfort level or baseline comfort level  9/1/2024 1332 by Klarissa Brown, RN  Outcome: Completed  9/1/2024 1332 by Klarissa Brown, RN  Outcome: Progressing

## 2024-09-01 NOTE — PROGRESS NOTES
Pt discharged home at 1:45 PM. Discharge instructions reviewed with pt, all questions answered. Intact IV removed without any complications. Belongings and six signed scripts sent with pt.     Electronically signed by Klarissa Sanchez RN on 9/1/2024 at 1:45 PM

## 2024-09-01 NOTE — CARE COORDINATION
Case Management Assessment  Initial Evaluation    Date/Time of Evaluation: 9/1/2024 12:23 PM  Assessment Completed by: KSENIA Suresh, JAVIERW    If patient is discharged prior to next notation, then this note serves as note for discharge by case management.    Patient Name: Jefe Molina                   YOB: 1970  Diagnosis: Alcohol abuse [F10.10]  Chest pain at rest [R07.9]  Chest pain, unspecified type [R07.9]  Nausea and vomiting, unspecified vomiting type [R11.2]                   Date / Time: 8/31/2024  4:33 PM    Patient Admission Status: Inpatient   Readmission Risk (Low < 19, Mod (19-27), High > 27): Readmission Risk Score: 9.5    Current PCP: Analisa Alan MD  PCP verified by CM? Yes    Chart Reviewed: Yes      History Provided by: Patient  Patient Orientation: Alert and Oriented    Patient Cognition: Alert    Hospitalization in the last 30 days (Readmission):  No    If yes, Readmission Assessment in  Navigator will be completed.    Advance Directives:      Code Status: Full Code   Patient's Primary Decision Maker is: Legal Next of Kin      Discharge Planning:    Patient lives with: Alone, Other (Comment) (pets, has one cat.) Type of Home: House  Primary Care Giver: Self  Patient Support Systems include: Parent   Current Financial resources: Medicaid  Current community resources: None  Current services prior to admission: None            Current DME:              Type of Home Care services:  None    ADLS  Prior functional level: Independent in ADLs/IADLs  Current functional level: Independent in ADLs/IADLs    PT AM-PAC:   /24  OT AM-PAC:   /24    Family can provide assistance at DC: No  Would you like Case Management to discuss the discharge plan with any other family members/significant others, and if so, who? No  Plans to Return to Present Housing: Yes  Other Identified Issues/Barriers to RETURNING to current housing: Limited insight.   Potential Assistance needed

## 2024-09-01 NOTE — PROGRESS NOTES
4 Eyes Skin Assessment     NAME:  Jefe Molina  YOB: 1970  MEDICAL RECORD NUMBER:  [id number]    The patient is being assessed for  Admission    I agree that at least one RN has performed a thorough Head to Toe Skin Assessment on the patient. ALL assessment sites listed below have been assessed.      Areas assessed by both nurses:    Head, Face, Ears, Shoulders, Back, Chest, Arms, Elbows, Hands, Sacrum. Buttock, Coccyx, Ischium, Legs. Feet and Heels, and Under Medical Devices         Does the Patient have a Wound? No noted wound(s)       Randy Prevention initiated by RN: No  Wound Care Orders initiated by RN: No    Pressure Injury (Stage 3,4, Unstageable, DTI, NWPT, and Complex wounds) if present, place Wound referral order by RN under : No    New Ostomies, if present place, Ostomy referral order under : No     Nurse 1 eSignature: Electronically signed by Chelsea Cho RN on 9/1/24 at 12:24 AM EDT    **SHARE this note so that the co-signing nurse can place an eSignature**    Nurse 2 eSignature: {Esignature:[id number]}

## 2025-07-22 ENCOUNTER — APPOINTMENT (OUTPATIENT)
Dept: CT IMAGING | Age: 55
End: 2025-07-22
Payer: MEDICAID

## 2025-07-22 ENCOUNTER — APPOINTMENT (OUTPATIENT)
Dept: GENERAL RADIOLOGY | Age: 55
End: 2025-07-22
Payer: MEDICAID

## 2025-07-22 ENCOUNTER — HOSPITAL ENCOUNTER (EMERGENCY)
Age: 55
Discharge: HOME | End: 2025-07-22
Attending: EMERGENCY MEDICINE
Payer: MEDICAID

## 2025-07-22 VITALS
BODY MASS INDEX: 27.2 KG/M2 | HEART RATE: 93 BPM | RESPIRATION RATE: 14 BRPM | HEIGHT: 68 IN | OXYGEN SATURATION: 97 % | TEMPERATURE: 98 F | WEIGHT: 179.45 LBS | DIASTOLIC BLOOD PRESSURE: 86 MMHG | SYSTOLIC BLOOD PRESSURE: 118 MMHG

## 2025-07-22 DIAGNOSIS — S20.211A CONTUSION OF RIB ON RIGHT SIDE, INITIAL ENCOUNTER: ICD-10-CM
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
## 2025-07-22 LAB
ALBUMIN SERPL-MCNC: 4.1 G/DL (ref 3.4–5)
ALBUMIN/GLOB SERPL: 1.4 {RATIO} (ref 1.1–2.2)
ALP SERPL-CCNC: 52 U/L (ref 40–129)
ALT SERPL-CCNC: 30 U/L (ref 10–40)
ANION GAP SERPL CALCULATED.3IONS-SCNC: 13 MMOL/L (ref 3–16)
AST SERPL-CCNC: 35 U/L (ref 15–37)
BASOPHILS # BLD: 0 K/UL (ref 0–0.2)
BASOPHILS NFR BLD: 0.7 %
BILIRUB SERPL-MCNC: <0.2 MG/DL (ref 0–1)
BUN SERPL-MCNC: 12 MG/DL (ref 7–20)
CALCIUM SERPL-MCNC: 8.9 MG/DL (ref 8.3–10.6)
CHLORIDE SERPL-SCNC: 104 MMOL/L (ref 99–110)
CO2 SERPL-SCNC: 23 MMOL/L (ref 21–32)
CREAT SERPL-MCNC: 0.9 MG/DL (ref 0.9–1.3)
DEPRECATED RDW RBC AUTO: 14 % (ref 12.4–15.4)
EOSINOPHIL # BLD: 0.2 K/UL (ref 0–0.6)
EOSINOPHIL NFR BLD: 4.7 %
GFR SERPLBLD CREATININE-BSD FMLA CKD-EPI: >90 ML/MIN/{1.73_M2}
GLUCOSE SERPL-MCNC: 104 MG/DL (ref 70–99)
HCT VFR BLD AUTO: 36.2 % (ref 40.5–52.5)
HGB BLD-MCNC: 12.4 G/DL (ref 13.5–17.5)
LYMPHOCYTES # BLD: 1.7 K/UL (ref 1–5.1)
LYMPHOCYTES NFR BLD: 33.5 %
MCH RBC QN AUTO: 33.8 PG (ref 26–34)
MCHC RBC AUTO-ENTMCNC: 34.3 G/DL (ref 31–36)
MCV RBC AUTO: 98.5 FL (ref 80–100)
MONOCYTES # BLD: 0.7 K/UL (ref 0–1.3)
MONOCYTES NFR BLD: 13 %
NEUTROPHILS # BLD: 2.5 K/UL (ref 1.7–7.7)
NEUTROPHILS NFR BLD: 48.1 %
PLATELET # BLD AUTO: 252 K/UL (ref 135–450)
PMV BLD AUTO: 7.6 FL (ref 5–10.5)
POTASSIUM SERPL-SCNC: 3.8 MMOL/L (ref 3.5–5.1)
PROT SERPL-MCNC: 7 G/DL (ref 6.4–8.2)
RBC # BLD AUTO: 3.67 M/UL (ref 4.2–5.9)
SODIUM SERPL-SCNC: 140 MMOL/L (ref 136–145)
TROPONIN, HIGH SENSITIVITY: 22 NG/L (ref 0–22)
WBC # BLD AUTO: 5.2 K/UL (ref 4–11)

## 2025-07-22 PROCEDURE — 72131 CT LUMBAR SPINE W/O DYE: CPT

## 2025-07-22 PROCEDURE — 85025 COMPLETE CBC W/AUTO DIFF WBC: CPT

## 2025-07-22 PROCEDURE — 99285 EMERGENCY DEPT VISIT HI MDM: CPT

## 2025-07-22 PROCEDURE — 80053 COMPREHEN METABOLIC PANEL: CPT

## 2025-07-22 PROCEDURE — 84484 ASSAY OF TROPONIN QUANT: CPT

## 2025-07-22 PROCEDURE — 93005 ELECTROCARDIOGRAM TRACING: CPT | Performed by: EMERGENCY MEDICINE

## 2025-07-22 PROCEDURE — 70450 CT HEAD/BRAIN W/O DYE: CPT

## 2025-07-22 PROCEDURE — 6370000000 HC RX 637 (ALT 250 FOR IP): Performed by: EMERGENCY MEDICINE

## 2025-07-22 PROCEDURE — 71046 X-RAY EXAM CHEST 2 VIEWS: CPT

## 2025-07-22 PROCEDURE — 72125 CT NECK SPINE W/O DYE: CPT

## 2025-07-22 RX ORDER — OXYCODONE HYDROCHLORIDE 5 MG/1
5 TABLET ORAL ONCE
Refills: 0 | Status: COMPLETED | OUTPATIENT
Start: 2025-07-22 | End: 2025-07-22

## 2025-07-22 RX ORDER — ACETAMINOPHEN 500 MG
1000 TABLET ORAL 3 TIMES DAILY PRN
Qty: 180 TABLET | Refills: 0 | Status: SHIPPED | OUTPATIENT
Start: 2025-07-22

## 2025-07-22 RX ORDER — LIDOCAINE 50 MG/G
1 PATCH TOPICAL DAILY
Qty: 10 PATCH | Refills: 0 | Status: SHIPPED | OUTPATIENT
Start: 2025-07-22 | End: 2025-08-01

## 2025-07-22 RX ORDER — LIDOCAINE 4 G/G
1 PATCH TOPICAL DAILY
Status: DISCONTINUED | OUTPATIENT
Start: 2025-07-22 | End: 2025-07-22 | Stop reason: HOSPADM

## 2025-07-22 RX ADMIN — OXYCODONE 5 MG: 5 TABLET ORAL at 07:13

## 2025-07-22 ASSESSMENT — PAIN DESCRIPTION - ORIENTATION
ORIENTATION: RIGHT
ORIENTATION: RIGHT;LOWER

## 2025-07-22 ASSESSMENT — PAIN DESCRIPTION - DESCRIPTORS
DESCRIPTORS: PATIENT UNABLE TO DESCRIBE
DESCRIPTORS: ACHING

## 2025-07-22 ASSESSMENT — PAIN DESCRIPTION - FREQUENCY: FREQUENCY: CONTINUOUS

## 2025-07-22 ASSESSMENT — PAIN DESCRIPTION - LOCATION
LOCATION: BACK;RIB CAGE
LOCATION: BACK;RIB CAGE

## 2025-07-22 ASSESSMENT — PAIN SCALES - GENERAL
PAINLEVEL_OUTOF10: 8
PAINLEVEL_OUTOF10: 3
PAINLEVEL_OUTOF10: 8

## 2025-07-22 ASSESSMENT — PAIN DESCRIPTION - ONSET: ONSET: ON-GOING

## 2025-07-22 ASSESSMENT — PAIN DESCRIPTION - PAIN TYPE: TYPE: ACUTE PAIN

## 2025-07-22 ASSESSMENT — PAIN - FUNCTIONAL ASSESSMENT
PAIN_FUNCTIONAL_ASSESSMENT: 0-10
PAIN_FUNCTIONAL_ASSESSMENT: PREVENTS OR INTERFERES SOME ACTIVE ACTIVITIES AND ADLS

## 2025-07-22 NOTE — PROGRESS NOTES
Pt returned from xray. Resting in stretcher at this time. C/o pain in back and right side of rib cage. Oxy IR tablet given per order. VSS on RA. Call light within reach.

## 2025-07-22 NOTE — ED TRIAGE NOTES
Pt to ED via EMS from home c/o right sided rib pain. Pt states he thinks he fell 2 days ago. States he has vertigo and COPD, and thinks he either got dizzy and fell or had a lack of O2 and fell. C/o right rib and low back pain now. Pt ambulatory at time of triage. A&O x4.     Pt also states that he is a daily drinker of 6 beers/ day. He says he \"drinks enough to keep me from twitching.\" His last drink was 2 hours ago. He says he has been through alcohol withdrawal before, but has never had any withdrawal seizures.

## 2025-07-22 NOTE — PROGRESS NOTES
PIV removed. Dressing put into place. Discharge instructions reviewed with pt. All questions answered. Pt escorted to lobby. Pt ambulated independently without complication.

## 2025-07-22 NOTE — DISCHARGE INSTRUCTIONS
The x-ray and CAT scan imaging did not show any evidence of a rib fracture you likely have a bruised rib.  You have been prescribed Tylenol and lidocaine patches to help with the pain also use the incentive spirometer multiple times per hour while awake, please follow-up with your primary care doctor

## 2025-07-22 NOTE — ED PROVIDER NOTES
[facility] EMERGENCY DEPARTMENT  EMERGENCY DEPARTMENT ENCOUNTER        Patient Name: Jefe Molina  MRN: [id number]  Birthdate 1970  Date of evaluation: 7/22/2025  Provider: Skinny Cooley MD  PCP: Analisa Alan MD  Note Started: 8:56 AM EDT 7/22/25    CHIEF COMPLAINT       Rib Pain (injury) (Pt to ED via EMS from home c/o right sided rib pain. Pt states he thinks he fell 2 days ago. States he has vertigo and COPD, and thinks he either got dizzy and fell or had a lack of O2 and fell. C/o rib and back pain now. Pt ambulatory at time of triage. )      HISTORY OF PRESENT ILLNESS: 1 or more Elements     History from : Patient    Limitations to history : None    Jefe Molina is a 55 y.o. male who presents for evaluation of fall, rib pain, patient reports that he fell 2 days ago.  He is unsure why he fell, he states that he may have gotten dizzy from his known vertigo.  He states that he has right-sided rib pain, back pain.  Ambulatory upon arrival.  No numbness or weakness to extremities.  He drinks alcohol on daily basis.  No blood thinners.    Nursing Notes were all reviewed and agreed with or any disagreements were addressed in the HPI.    REVIEW OF SYSTEMS :      Review of Systems    Positives and Pertinent negatives as per HPI.     SURGICAL HISTORY     Past Surgical History:   Procedure Laterality Date    WRIST SURGERY Right        CURRENTMEDICATIONS       Discharge Medication List as of 7/22/2025  8:41 AM        CONTINUE these medications which have NOT CHANGED    Details   pantoprazole (PROTONIX) 40 MG tablet Take 1 tablet by mouth 2 times daily (before meals), Disp-60 tablet, R-3Print      nitroGLYCERIN (NITROSTAT) 0.4 MG SL tablet Place 1 tablet under the tongue every 5 minutes as needed for Chest pain up to max of 3 total doses. If no relief after 1 dose, call 911., Disp-25 tablet, R-3Print      vitamin B-1 (THIAMINE) 100 MG tablet Take 1 tablet by mouth

## 2025-07-23 LAB
EKG ATRIAL RATE: 88 BPM
EKG DIAGNOSIS: NORMAL
EKG P AXIS: 61 DEGREES
EKG P-R INTERVAL: 172 MS
EKG Q-T INTERVAL: 350 MS
EKG QRS DURATION: 80 MS
EKG QTC CALCULATION (BAZETT): 423 MS
EKG R AXIS: 34 DEGREES
EKG T AXIS: 44 DEGREES
EKG VENTRICULAR RATE: 88 BPM

## 2025-07-23 PROCEDURE — 93010 ELECTROCARDIOGRAM REPORT: CPT | Performed by: STUDENT IN AN ORGANIZED HEALTH CARE EDUCATION/TRAINING PROGRAM
